# Patient Record
Sex: FEMALE | Race: ASIAN | Employment: FULL TIME | URBAN - METROPOLITAN AREA
[De-identification: names, ages, dates, MRNs, and addresses within clinical notes are randomized per-mention and may not be internally consistent; named-entity substitution may affect disease eponyms.]

---

## 2018-04-03 ENCOUNTER — OFFICE VISIT (OUTPATIENT)
Dept: INTERNAL MEDICINE CLINIC | Facility: CLINIC | Age: 38
End: 2018-04-03

## 2018-04-03 VITALS
RESPIRATION RATE: 16 BRPM | WEIGHT: 139 LBS | HEIGHT: 65 IN | DIASTOLIC BLOOD PRESSURE: 88 MMHG | TEMPERATURE: 98 F | BODY MASS INDEX: 23.16 KG/M2 | HEART RATE: 86 BPM | SYSTOLIC BLOOD PRESSURE: 132 MMHG

## 2018-04-03 DIAGNOSIS — Z00.00 WELLNESS EXAMINATION: Primary | ICD-10-CM

## 2018-04-03 DIAGNOSIS — Z71.84 TRAVEL ADVICE ENCOUNTER: ICD-10-CM

## 2018-04-03 DIAGNOSIS — Z23 NEED FOR TDAP VACCINATION: ICD-10-CM

## 2018-04-03 DIAGNOSIS — Z00.00 LABORATORY EXAMINATION ORDERED AS PART OF A ROUTINE GENERAL MEDICAL EXAMINATION: ICD-10-CM

## 2018-04-03 PROCEDURE — 90715 TDAP VACCINE 7 YRS/> IM: CPT | Performed by: INTERNAL MEDICINE

## 2018-04-03 PROCEDURE — 90471 IMMUNIZATION ADMIN: CPT | Performed by: INTERNAL MEDICINE

## 2018-04-03 PROCEDURE — 90632 HEPA VACCINE ADULT IM: CPT | Performed by: INTERNAL MEDICINE

## 2018-04-03 PROCEDURE — 99385 PREV VISIT NEW AGE 18-39: CPT | Performed by: INTERNAL MEDICINE

## 2018-04-03 PROCEDURE — 90472 IMMUNIZATION ADMIN EACH ADD: CPT | Performed by: INTERNAL MEDICINE

## 2018-04-03 NOTE — PROGRESS NOTES
[facility] Internal Medicine Office Note  Chief Complaint:   Patient presents with:  Establish Care  Wellness Visit: per pt last pap was 12/2017 with Dr. Sandra Bazan      HPI:   This is a 44year old female coming in for physical  HPI  Establishing body mass index is 23.13 kg/m² as calculated from the following:    Height as of this encounter: 65\". Weight as of this encounter: 139 lb. Vital signs reviewed. Appears stated age, well groomed. Physical Exam   Vitals reviewed.    Constitutional: She HEPATITIS A VACCINE      Orders Placed This Encounter      CMP      Lipid Panel      TSH W Reflex To Free T4 [E]      CBC W Differential W Platelet [E]      TdaP (Adacel, Boostrix) (51938) (DX V06.1/Z23)      Hep A, Adult (30372) (DxV05.3/Z23)    Meds

## 2018-04-03 NOTE — PATIENT INSTRUCTIONS
Check blood pressure periodically at home. Goal is below 140/90.  If persistently above 140 for top number or 90 for bottom, please follow up    Start typhoid vaccine pills in 1 week - take every other day for a total of 4 capsules    For your records:  Arnie Suresh

## 2018-04-13 ENCOUNTER — MED REC SCAN ONLY (OUTPATIENT)
Dept: INTERNAL MEDICINE CLINIC | Facility: CLINIC | Age: 38
End: 2018-04-13

## 2018-04-18 ENCOUNTER — TELEPHONE (OUTPATIENT)
Dept: INTERNAL MEDICINE CLINIC | Facility: CLINIC | Age: 38
End: 2018-04-18

## 2018-04-18 DIAGNOSIS — Z00.00 BLOOD TESTS FOR ROUTINE GENERAL PHYSICAL EXAMINATION: Primary | ICD-10-CM

## 2018-04-18 NOTE — TELEPHONE ENCOUNTER
Patient called and stated that she needs her order for labs to be changed to [facility]. Please advise.

## 2018-04-25 ENCOUNTER — TELEPHONE (OUTPATIENT)
Dept: INTERNAL MEDICINE CLINIC | Facility: CLINIC | Age: 38
End: 2018-04-25

## 2018-11-01 ENCOUNTER — TELEPHONE (OUTPATIENT)
Dept: INTERNAL MEDICINE CLINIC | Facility: CLINIC | Age: 38
End: 2018-11-01

## 2018-11-01 ENCOUNTER — NURSE ONLY (OUTPATIENT)
Dept: INTERNAL MEDICINE CLINIC | Facility: CLINIC | Age: 38
End: 2018-11-01
Payer: COMMERCIAL

## 2018-11-01 PROCEDURE — 90632 HEPA VACCINE ADULT IM: CPT | Performed by: INTERNAL MEDICINE

## 2018-11-01 PROCEDURE — 90471 IMMUNIZATION ADMIN: CPT | Performed by: INTERNAL MEDICINE

## 2019-04-09 ENCOUNTER — OFFICE VISIT (OUTPATIENT)
Dept: INTERNAL MEDICINE CLINIC | Facility: CLINIC | Age: 39
End: 2019-04-09
Payer: COMMERCIAL

## 2019-04-09 VITALS
DIASTOLIC BLOOD PRESSURE: 88 MMHG | BODY MASS INDEX: 23.95 KG/M2 | HEIGHT: 65 IN | SYSTOLIC BLOOD PRESSURE: 136 MMHG | RESPIRATION RATE: 16 BRPM | HEART RATE: 108 BPM | WEIGHT: 143.75 LBS | TEMPERATURE: 98 F

## 2019-04-09 DIAGNOSIS — Z00.00 LABORATORY EXAM ORDERED AS PART OF ROUTINE GENERAL MEDICAL EXAMINATION: ICD-10-CM

## 2019-04-09 DIAGNOSIS — Z00.00 ROUTINE PHYSICAL EXAMINATION: Primary | ICD-10-CM

## 2019-04-09 PROCEDURE — 99395 PREV VISIT EST AGE 18-39: CPT | Performed by: INTERNAL MEDICINE

## 2019-04-09 NOTE — PROGRESS NOTES
[facility] Internal Medicine Office Note  Chief Complaint:   Patient presents with:  Physical      HPI:   This is a 43year old female coming in for physical   HPI  Dr. Min Acevedo - alexandre and will see at the end of the month for pap    Blood pressur atraumatic. Right Ear: Tympanic membrane is not erythematous. Left Ear: Tympanic membrane is not erythematous. Mouth/Throat: No posterior oropharyngeal erythema. Eyes: Conjunctivae are normal.   Neck: Neck supple.    Cardiovascular: Normal rate, reg treatments as a result of today.      Ciarra Barnes MD

## 2019-04-09 NOTE — PATIENT INSTRUCTIONS
Measure blood pressure at home and goal is below 140 for top number(systolic) and below 90 for bottom number(diastolic)  If more than 2/3 of readings are above 90 for bottom number, follow up and we will discuss starting blood pressure medication.      Christian

## 2019-07-24 ENCOUNTER — OFFICE VISIT (OUTPATIENT)
Dept: INTERNAL MEDICINE CLINIC | Facility: CLINIC | Age: 39
End: 2019-07-24
Payer: COMMERCIAL

## 2019-07-24 VITALS
OXYGEN SATURATION: 98 % | SYSTOLIC BLOOD PRESSURE: 138 MMHG | HEART RATE: 81 BPM | BODY MASS INDEX: 24.32 KG/M2 | HEIGHT: 65 IN | RESPIRATION RATE: 16 BRPM | TEMPERATURE: 98 F | WEIGHT: 146 LBS | DIASTOLIC BLOOD PRESSURE: 88 MMHG

## 2019-07-24 DIAGNOSIS — I10 ESSENTIAL HYPERTENSION: Primary | ICD-10-CM

## 2019-07-24 DIAGNOSIS — G43.009 MIGRAINE WITHOUT AURA AND WITHOUT STATUS MIGRAINOSUS, NOT INTRACTABLE: ICD-10-CM

## 2019-07-24 PROCEDURE — 99213 OFFICE O/P EST LOW 20 MIN: CPT | Performed by: INTERNAL MEDICINE

## 2019-07-24 RX ORDER — ONDANSETRON 4 MG/1
4 TABLET, ORALLY DISINTEGRATING ORAL EVERY 8 HOURS PRN
Qty: 20 TABLET | Refills: 0 | Status: SHIPPED | OUTPATIENT
Start: 2019-07-24

## 2019-07-24 RX ORDER — LISINOPRIL 2.5 MG/1
2.5 TABLET ORAL DAILY
Qty: 30 TABLET | Refills: 1 | Status: SHIPPED | OUTPATIENT
Start: 2019-07-24 | End: 2019-10-05 | Stop reason: ALTCHOICE

## 2019-07-24 NOTE — PROGRESS NOTES
[address] [facility] Internal Medicine Office Note  Chief Complaint:   Patient presents with:  Blood Pressure      HPI:   This is a 43year old female coming in for blood pressure   HPI  HTN - diastolic high, usually in 90s-100s but also about a quarter of lb   SpO2 98%   BMI 24.30 kg/m²  Estimated body mass index is 24.3 kg/m² as calculated from the following:    Height as of this encounter: 65\". Weight as of this encounter: 146 lb. Vital signs reviewed. Appears stated age, well groomed.   Physical Exa Consults:  None    There are no preventive care reminders to display for this patient. Patient/Caregiver Education: Patient/Caregiver Education: There are no barriers to learning. Medical education done. Outcome: Patient verbalizes understanding.  Patient

## 2019-07-24 NOTE — PATIENT INSTRUCTIONS
Start lisinopril 2.5mg once daily     ondansetron as needed for nausea related to migraine.  Then 1-2 tablets of naproxen/aleve every 12 hours

## 2019-08-05 ENCOUNTER — PATIENT MESSAGE (OUTPATIENT)
Dept: INTERNAL MEDICINE CLINIC | Facility: CLINIC | Age: 39
End: 2019-08-05

## 2019-08-06 RX ORDER — AMLODIPINE BESYLATE 2.5 MG/1
2.5 TABLET ORAL DAILY
Qty: 30 TABLET | Refills: 0 | Status: SHIPPED | OUTPATIENT
Start: 2019-08-06 | End: 2019-08-20

## 2019-08-20 NOTE — TELEPHONE ENCOUNTER
Refill requested:   Requested Prescriptions     Pending Prescriptions Disp Refills   • amLODIPine Besylate 2.5 MG Oral Tab 30 tablet 0     Sig: Take 1 tablet (2.5 mg total) by mouth daily.      Passed protocol    Last refill: 8/6/19 30 0R     Labs:  4/27/19

## 2019-08-21 RX ORDER — AMLODIPINE BESYLATE 2.5 MG/1
2.5 TABLET ORAL DAILY
Qty: 90 TABLET | Refills: 0 | Status: SHIPPED | OUTPATIENT
Start: 2019-08-21 | End: 2019-11-20

## 2019-10-05 ENCOUNTER — OFFICE VISIT (OUTPATIENT)
Dept: INTERNAL MEDICINE CLINIC | Facility: CLINIC | Age: 39
End: 2019-10-05
Payer: COMMERCIAL

## 2019-10-05 VITALS
HEIGHT: 65 IN | SYSTOLIC BLOOD PRESSURE: 124 MMHG | BODY MASS INDEX: 24.41 KG/M2 | OXYGEN SATURATION: 99 % | RESPIRATION RATE: 16 BRPM | DIASTOLIC BLOOD PRESSURE: 76 MMHG | HEART RATE: 94 BPM | TEMPERATURE: 98 F | WEIGHT: 146.5 LBS

## 2019-10-05 DIAGNOSIS — Z23 NEED FOR INFLUENZA VACCINATION: ICD-10-CM

## 2019-10-05 DIAGNOSIS — I10 ESSENTIAL HYPERTENSION: Primary | ICD-10-CM

## 2019-10-05 PROCEDURE — 99213 OFFICE O/P EST LOW 20 MIN: CPT | Performed by: INTERNAL MEDICINE

## 2019-10-05 PROCEDURE — 90686 IIV4 VACC NO PRSV 0.5 ML IM: CPT | Performed by: INTERNAL MEDICINE

## 2019-10-05 PROCEDURE — 90471 IMMUNIZATION ADMIN: CPT | Performed by: INTERNAL MEDICINE

## 2019-10-05 NOTE — PROGRESS NOTES
[facility] Internal Medicine Office Note  Chief Complaint:   Patient presents with:  Medication Follow-Up      HPI:   This is a 35year old female coming in for BP follow up  HPI    HTN - on amlodipine 2.5mg and BP has been doing well - highest w 24.38 kg/m²  Estimated body mass index is 24.38 kg/m² as calculated from the following:    Height as of this encounter: 65\". Weight as of this encounter: 146 lb 8 oz (66.5 kg). Vital signs reviewed. Appears stated age, well groomed.   Physical Exam from the treatments as a result of today.      Dyllan Hanson MD

## 2019-11-16 ENCOUNTER — HOSPITAL ENCOUNTER (OUTPATIENT)
Dept: MAMMOGRAPHY | Facility: HOSPITAL | Age: 39
Discharge: HOME | End: 2019-11-16
Attending: STUDENT IN AN ORGANIZED HEALTH CARE EDUCATION/TRAINING PROGRAM
Payer: COMMERCIAL

## 2019-11-16 PROCEDURE — 77067 SCR MAMMO BI INCL CAD: CPT | Performed by: STUDENT IN AN ORGANIZED HEALTH CARE EDUCATION/TRAINING PROGRAM

## 2019-11-16 PROCEDURE — 77063 BREAST TOMOSYNTHESIS BI: CPT | Performed by: STUDENT IN AN ORGANIZED HEALTH CARE EDUCATION/TRAINING PROGRAM

## 2019-11-21 RX ORDER — AMLODIPINE BESYLATE 2.5 MG/1
TABLET ORAL
Qty: 90 TABLET | Refills: 1 | Status: SHIPPED | OUTPATIENT
Start: 2019-11-21 | End: 2020-06-01

## 2019-11-21 NOTE — TELEPHONE ENCOUNTER
Amlodipine Besylate 2.5 mg Oral Tab     Passed Protocol    Last OV relevant to medication: 10/5/2019  Last refill date: 8/21/2019     #/refills: #90 w/ 0 refills   When pt was asked to return for OV: 6 months   Upcoming appt/reason: No future appointments

## 2020-06-01 RX ORDER — AMLODIPINE BESYLATE 2.5 MG/1
TABLET ORAL
Qty: 90 TABLET | Refills: 0 | Status: SHIPPED | OUTPATIENT
Start: 2020-06-01 | End: 2020-08-31

## 2020-06-26 ENCOUNTER — OFFICE VISIT (OUTPATIENT)
Dept: INTERNAL MEDICINE CLINIC | Facility: CLINIC | Age: 40
End: 2020-06-26
Payer: COMMERCIAL

## 2020-06-26 VITALS
HEART RATE: 98 BPM | HEIGHT: 65.25 IN | BODY MASS INDEX: 24.53 KG/M2 | DIASTOLIC BLOOD PRESSURE: 76 MMHG | OXYGEN SATURATION: 99 % | SYSTOLIC BLOOD PRESSURE: 124 MMHG | RESPIRATION RATE: 16 BRPM | TEMPERATURE: 98 F | WEIGHT: 149 LBS

## 2020-06-26 DIAGNOSIS — Z00.00 ENCOUNTER FOR ROUTINE ADULT MEDICAL EXAMINATION: Primary | ICD-10-CM

## 2020-06-26 DIAGNOSIS — I10 ESSENTIAL HYPERTENSION: ICD-10-CM

## 2020-06-26 DIAGNOSIS — Z00.00 LABORATORY EXAM ORDERED AS PART OF ROUTINE GENERAL MEDICAL EXAMINATION: ICD-10-CM

## 2020-06-26 PROCEDURE — 99396 PREV VISIT EST AGE 40-64: CPT | Performed by: INTERNAL MEDICINE

## 2020-06-26 RX ORDER — MULTIVIT,CALC,MINS/IRON/FOLIC 500-18-0.4
TABLET ORAL
COMMUNITY
Start: 2020-01-01

## 2020-06-26 NOTE — PROGRESS NOTES
[facility] Internal Medicine Office Note  Chief Complaint:   Patient presents with:  Physical      HPI:   This is a 35year old female coming in for physical   HPI  She has been having worsening cramping and headaches for the week prior to her me Location: Right arm, Patient Position: Sitting, Cuff Size: adult)   Pulse 98   Temp 98.2 °F (36.8 °C) (Oral)   Resp 16   Ht 65.25\"   Wt 149 lb (67.6 kg)   LMP 11/04/2019 (Exact Date)   SpO2 99%   BMI 24.61 kg/m²  Estimated body mass index is 24.61 kg/m² a Reflex To Free T4 [E]      Meds & Refills for this Visit:  Requested Prescriptions      No prescriptions requested or ordered in this encounter       Imaging & Consults:  None    Pap Smear,3 Years due on 04/05/2020  Annual Physical due on 04/09/2020  Serina

## 2020-07-08 ENCOUNTER — LAB ENCOUNTER (OUTPATIENT)
Dept: LAB | Age: 40
End: 2020-07-08
Attending: INTERNAL MEDICINE
Payer: COMMERCIAL

## 2020-07-08 DIAGNOSIS — Z00.00 LABORATORY EXAM ORDERED AS PART OF ROUTINE GENERAL MEDICAL EXAMINATION: ICD-10-CM

## 2020-07-08 LAB
ALBUMIN SERPL-MCNC: 4.2 G/DL (ref 3.4–5)
ALBUMIN/GLOB SERPL: 1.2 {RATIO} (ref 1–2)
ALP LIVER SERPL-CCNC: 66 U/L (ref 37–98)
ALT SERPL-CCNC: 62 U/L (ref 13–56)
ANION GAP SERPL CALC-SCNC: 3 MMOL/L (ref 0–18)
AST SERPL-CCNC: 25 U/L (ref 15–37)
BASOPHILS # BLD AUTO: 0.07 X10(3) UL (ref 0–0.2)
BASOPHILS NFR BLD AUTO: 1 %
BILIRUB SERPL-MCNC: 0.9 MG/DL (ref 0.1–2)
BUN BLD-MCNC: 15 MG/DL (ref 7–18)
BUN/CREAT SERPL: 17.6 (ref 10–20)
CALCIUM BLD-MCNC: 9 MG/DL (ref 8.5–10.1)
CHLORIDE SERPL-SCNC: 107 MMOL/L (ref 98–112)
CHOLEST SMN-MCNC: 208 MG/DL (ref ?–200)
CO2 SERPL-SCNC: 28 MMOL/L (ref 21–32)
CREAT BLD-MCNC: 0.85 MG/DL (ref 0.55–1.02)
DEPRECATED RDW RBC AUTO: 46.4 FL (ref 35.1–46.3)
EOSINOPHIL # BLD AUTO: 0.09 X10(3) UL (ref 0–0.7)
EOSINOPHIL NFR BLD AUTO: 1.2 %
ERYTHROCYTE [DISTWIDTH] IN BLOOD BY AUTOMATED COUNT: 13.5 % (ref 11–15)
GLOBULIN PLAS-MCNC: 3.5 G/DL (ref 2.8–4.4)
GLUCOSE BLD-MCNC: 100 MG/DL (ref 70–99)
HCT VFR BLD AUTO: 44.1 % (ref 35–48)
HDLC SERPL-MCNC: 43 MG/DL (ref 40–59)
HGB BLD-MCNC: 14.4 G/DL (ref 12–16)
IMM GRANULOCYTES # BLD AUTO: 0.02 X10(3) UL (ref 0–1)
IMM GRANULOCYTES NFR BLD: 0.3 %
LDLC SERPL CALC-MCNC: 126 MG/DL (ref ?–100)
LYMPHOCYTES # BLD AUTO: 1.96 X10(3) UL (ref 1–4)
LYMPHOCYTES NFR BLD AUTO: 26.7 %
M PROTEIN MFR SERPL ELPH: 7.7 G/DL (ref 6.4–8.2)
MCH RBC QN AUTO: 30.8 PG (ref 26–34)
MCHC RBC AUTO-ENTMCNC: 32.7 G/DL (ref 31–37)
MCV RBC AUTO: 94.2 FL (ref 80–100)
MONOCYTES # BLD AUTO: 0.56 X10(3) UL (ref 0.1–1)
MONOCYTES NFR BLD AUTO: 7.6 %
NEUTROPHILS # BLD AUTO: 4.65 X10 (3) UL (ref 1.5–7.7)
NEUTROPHILS # BLD AUTO: 4.65 X10(3) UL (ref 1.5–7.7)
NEUTROPHILS NFR BLD AUTO: 63.2 %
NONHDLC SERPL-MCNC: 165 MG/DL (ref ?–130)
OSMOLALITY SERPL CALC.SUM OF ELEC: 287 MOSM/KG (ref 275–295)
PATIENT FASTING Y/N/NP: YES
PATIENT FASTING Y/N/NP: YES
PLATELET # BLD AUTO: 204 10(3)UL (ref 150–450)
POTASSIUM SERPL-SCNC: 3.8 MMOL/L (ref 3.5–5.1)
RBC # BLD AUTO: 4.68 X10(6)UL (ref 3.8–5.3)
SODIUM SERPL-SCNC: 138 MMOL/L (ref 136–145)
TRIGL SERPL-MCNC: 196 MG/DL (ref 30–149)
TSI SER-ACNC: 1.48 MIU/ML (ref 0.36–3.74)
VLDLC SERPL CALC-MCNC: 39 MG/DL (ref 0–30)
WBC # BLD AUTO: 7.4 X10(3) UL (ref 4–11)

## 2020-07-08 PROCEDURE — 80061 LIPID PANEL: CPT | Performed by: INTERNAL MEDICINE

## 2020-07-08 PROCEDURE — 36415 COLL VENOUS BLD VENIPUNCTURE: CPT | Performed by: INTERNAL MEDICINE

## 2020-07-08 PROCEDURE — 80050 GENERAL HEALTH PANEL: CPT | Performed by: INTERNAL MEDICINE

## 2020-07-09 DIAGNOSIS — R74.8 ELEVATED LIVER ENZYMES: Primary | ICD-10-CM

## 2020-07-16 ENCOUNTER — TELEPHONE (OUTPATIENT)
Dept: INTERNAL MEDICINE CLINIC | Facility: CLINIC | Age: 40
End: 2020-07-16

## 2020-07-28 ENCOUNTER — APPOINTMENT (OUTPATIENT)
Dept: LAB | Age: 40
End: 2020-07-28
Attending: INTERNAL MEDICINE
Payer: COMMERCIAL

## 2020-07-28 DIAGNOSIS — R74.8 ELEVATED LIVER ENZYMES: ICD-10-CM

## 2020-07-28 LAB
ALBUMIN SERPL-MCNC: 3.9 G/DL (ref 3.4–5)
ALP LIVER SERPL-CCNC: 70 U/L (ref 37–98)
ALT SERPL-CCNC: 83 U/L (ref 13–56)
AST SERPL-CCNC: 26 U/L (ref 15–37)
BILIRUB DIRECT SERPL-MCNC: 0.1 MG/DL (ref 0–0.2)
BILIRUB SERPL-MCNC: 0.6 MG/DL (ref 0.1–2)
DEPRECATED HBV CORE AB SER IA-ACNC: 23.3 NG/ML (ref 12–240)
HBV SURFACE AB SER QL: NONREACTIVE
HBV SURFACE AB SERPL IA-ACNC: 6.64 MIU/ML
HBV SURFACE AG SER-ACNC: <0.1 [IU]/L
HBV SURFACE AG SERPL QL IA: NONREACTIVE
HCV AB SERPL QL IA: NONREACTIVE
M PROTEIN MFR SERPL ELPH: 7.6 G/DL (ref 6.4–8.2)
TRANSFERRIN SERPL-MCNC: 343 MG/DL (ref 200–360)

## 2020-07-28 PROCEDURE — 86706 HEP B SURFACE ANTIBODY: CPT | Performed by: INTERNAL MEDICINE

## 2020-07-28 PROCEDURE — 80076 HEPATIC FUNCTION PANEL: CPT | Performed by: INTERNAL MEDICINE

## 2020-07-28 PROCEDURE — 87340 HEPATITIS B SURFACE AG IA: CPT | Performed by: INTERNAL MEDICINE

## 2020-07-28 PROCEDURE — 84466 ASSAY OF TRANSFERRIN: CPT | Performed by: INTERNAL MEDICINE

## 2020-07-28 PROCEDURE — 86803 HEPATITIS C AB TEST: CPT | Performed by: INTERNAL MEDICINE

## 2020-07-28 PROCEDURE — 82728 ASSAY OF FERRITIN: CPT | Performed by: INTERNAL MEDICINE

## 2020-07-28 PROCEDURE — 36415 COLL VENOUS BLD VENIPUNCTURE: CPT | Performed by: INTERNAL MEDICINE

## 2020-07-30 DIAGNOSIS — R74.8 ELEVATED LIVER ENZYMES: Primary | ICD-10-CM

## 2020-07-31 ENCOUNTER — TELEPHONE (OUTPATIENT)
Dept: INTERNAL MEDICINE CLINIC | Facility: CLINIC | Age: 40
End: 2020-07-31

## 2020-08-03 ENCOUNTER — NURSE ONLY (OUTPATIENT)
Dept: INTERNAL MEDICINE CLINIC | Facility: CLINIC | Age: 40
End: 2020-08-03
Payer: COMMERCIAL

## 2020-08-03 PROCEDURE — 90746 HEPB VACCINE 3 DOSE ADULT IM: CPT | Performed by: INTERNAL MEDICINE

## 2020-08-03 PROCEDURE — 90471 IMMUNIZATION ADMIN: CPT | Performed by: INTERNAL MEDICINE

## 2020-08-15 ENCOUNTER — HOSPITAL ENCOUNTER (OUTPATIENT)
Dept: ULTRASOUND IMAGING | Age: 40
Discharge: HOME | End: 2020-08-15
Attending: INTERNAL MEDICINE
Payer: COMMERCIAL

## 2020-08-15 DIAGNOSIS — R74.8 ELEVATED LIVER ENZYMES: ICD-10-CM

## 2020-08-15 PROCEDURE — 76700 US EXAM ABDOM COMPLETE: CPT | Performed by: INTERNAL MEDICINE

## 2020-08-18 DIAGNOSIS — R74.8 ELEVATED LIVER ENZYMES: Primary | ICD-10-CM

## 2020-08-31 NOTE — TELEPHONE ENCOUNTER
LOV: 6/26/2020 with Dr. Pat Snyder  RTC: 6 months  Last Relevant Labs: July 2020  Filled: 6/1/2020  #90 with 0 refills    Future Appointments   Date Time Provider Kaela Olsen   9/8/2020  2:30 PM [facility] 08 NURSE [facility] 8 [facility] [place]

## 2020-09-01 RX ORDER — AMLODIPINE BESYLATE 2.5 MG/1
2.5 TABLET ORAL DAILY
Qty: 90 TABLET | Refills: 1 | Status: SHIPPED | OUTPATIENT
Start: 2020-09-01 | End: 2021-02-16

## 2020-09-08 ENCOUNTER — NURSE ONLY (OUTPATIENT)
Dept: INTERNAL MEDICINE CLINIC | Facility: CLINIC | Age: 40
End: 2020-09-08
Payer: COMMERCIAL

## 2020-09-08 ENCOUNTER — TELEPHONE (OUTPATIENT)
Dept: INTERNAL MEDICINE CLINIC | Facility: CLINIC | Age: 40
End: 2020-09-08

## 2020-09-08 PROCEDURE — 90746 HEPB VACCINE 3 DOSE ADULT IM: CPT | Performed by: INTERNAL MEDICINE

## 2020-09-08 PROCEDURE — 90471 IMMUNIZATION ADMIN: CPT | Performed by: INTERNAL MEDICINE

## 2020-09-08 NOTE — TELEPHONE ENCOUNTER
Pt received 1st Hep B on 8/3/2020    Pt coming in for nurse visit today for 2nd Hep B. Please advise.  TY

## 2020-09-19 ENCOUNTER — LAB ENCOUNTER (OUTPATIENT)
Dept: LAB | Age: 40
End: 2020-09-19
Attending: INTERNAL MEDICINE
Payer: COMMERCIAL

## 2020-09-19 DIAGNOSIS — R74.8 ELEVATED LIVER ENZYMES: ICD-10-CM

## 2020-09-19 LAB
ALBUMIN SERPL-MCNC: 4.1 G/DL (ref 3.4–5)
ALBUMIN/GLOB SERPL: 1.2 {RATIO} (ref 1–2)
ALP LIVER SERPL-CCNC: 70 U/L
ALT SERPL-CCNC: 60 U/L
ANION GAP SERPL CALC-SCNC: 6 MMOL/L (ref 0–18)
AST SERPL-CCNC: 25 U/L (ref 15–37)
BILIRUB SERPL-MCNC: 0.5 MG/DL (ref 0.1–2)
BUN BLD-MCNC: 12 MG/DL (ref 7–18)
BUN/CREAT SERPL: 15.8 (ref 10–20)
CALCIUM BLD-MCNC: 9.2 MG/DL (ref 8.5–10.1)
CHLORIDE SERPL-SCNC: 106 MMOL/L (ref 98–112)
CO2 SERPL-SCNC: 28 MMOL/L (ref 21–32)
CREAT BLD-MCNC: 0.76 MG/DL
GLOBULIN PLAS-MCNC: 3.5 G/DL (ref 2.8–4.4)
GLUCOSE BLD-MCNC: 100 MG/DL (ref 70–99)
M PROTEIN MFR SERPL ELPH: 7.6 G/DL (ref 6.4–8.2)
OSMOLALITY SERPL CALC.SUM OF ELEC: 290 MOSM/KG (ref 275–295)
PATIENT FASTING Y/N/NP: YES
POTASSIUM SERPL-SCNC: 3.7 MMOL/L (ref 3.5–5.1)
SODIUM SERPL-SCNC: 140 MMOL/L (ref 136–145)

## 2020-09-19 PROCEDURE — 80053 COMPREHEN METABOLIC PANEL: CPT

## 2020-09-19 PROCEDURE — 36415 COLL VENOUS BLD VENIPUNCTURE: CPT

## 2020-09-21 DIAGNOSIS — R74.8 ELEVATED LIVER ENZYMES: Primary | ICD-10-CM

## 2020-12-21 ENCOUNTER — LAB ENCOUNTER (OUTPATIENT)
Dept: LAB | Age: 40
End: 2020-12-21
Attending: INTERNAL MEDICINE
Payer: COMMERCIAL

## 2020-12-21 DIAGNOSIS — R74.8 ELEVATED LIVER ENZYMES: ICD-10-CM

## 2020-12-21 PROCEDURE — 36415 COLL VENOUS BLD VENIPUNCTURE: CPT

## 2020-12-21 PROCEDURE — 80076 HEPATIC FUNCTION PANEL: CPT

## 2021-01-28 ENCOUNTER — TELEPHONE (OUTPATIENT)
Dept: INTERNAL MEDICINE CLINIC | Facility: CLINIC | Age: 41
End: 2021-01-28

## 2021-01-28 NOTE — TELEPHONE ENCOUNTER
1st hep b given 8/3/20  2nd hep b given 9/8/20    3rd hep b orders pending. Will pt need to wait until 2/8/21 to receive?

## 2021-01-29 ENCOUNTER — HOSPITAL ENCOUNTER (OUTPATIENT)
Dept: MAMMOGRAPHY | Age: 41
Discharge: HOME | End: 2021-01-29
Attending: STUDENT IN AN ORGANIZED HEALTH CARE EDUCATION/TRAINING PROGRAM
Payer: COMMERCIAL

## 2021-01-29 PROCEDURE — 77063 BREAST TOMOSYNTHESIS BI: CPT | Performed by: STUDENT IN AN ORGANIZED HEALTH CARE EDUCATION/TRAINING PROGRAM

## 2021-01-29 PROCEDURE — 77067 SCR MAMMO BI INCL CAD: CPT | Performed by: STUDENT IN AN ORGANIZED HEALTH CARE EDUCATION/TRAINING PROGRAM

## 2021-02-03 ENCOUNTER — NURSE ONLY (OUTPATIENT)
Dept: INTERNAL MEDICINE CLINIC | Facility: CLINIC | Age: 41
End: 2021-02-03
Payer: COMMERCIAL

## 2021-02-03 PROCEDURE — 90746 HEPB VACCINE 3 DOSE ADULT IM: CPT | Performed by: INTERNAL MEDICINE

## 2021-02-03 PROCEDURE — 90471 IMMUNIZATION ADMIN: CPT | Performed by: INTERNAL MEDICINE

## 2021-02-15 NOTE — TELEPHONE ENCOUNTER
Name from pharmacy: AMLODIPINE BESYLATE 2.5 MG TAB          Will file in chart as: AMLODIPINE BESYLATE 2.5 MG Oral Tab    Sig: TAKE 1 TABLET BY MOUTH EVERY DAY    Disp:  90 tablet    Refills:  1    Start: 2/12/2021    Class: Normal    Non-formulary    Last

## 2021-02-16 RX ORDER — AMLODIPINE BESYLATE 2.5 MG/1
TABLET ORAL
Qty: 90 TABLET | Refills: 0 | Status: SHIPPED | OUTPATIENT
Start: 2021-02-16 | End: 2021-03-02

## 2021-03-01 NOTE — PROGRESS NOTES
José Luis Gaitan is a 38year old female. HPI:   Patient presents for f/u of HTN. Takes amlodipine nightly, no SEs. Home BP readings in the 120s-130s/80-90s. Home resting HR in the 90s.   Admits to being anxious when coming into dr office since Obi kg)   LMP 02/16/2021   SpO2 99%   BMI 25.01 kg/m²   GENERAL: well developed, well nourished, in no acute distress  SKIN: no rashes, no suspicious lesions  HEENT: normocephalic, atraumatic, conjunctiva clear  NECK: supple, no thyromegaly, no lymphadenopathy

## 2021-03-02 ENCOUNTER — OFFICE VISIT (OUTPATIENT)
Dept: INTERNAL MEDICINE CLINIC | Facility: CLINIC | Age: 41
End: 2021-03-02
Payer: COMMERCIAL

## 2021-03-02 VITALS
DIASTOLIC BLOOD PRESSURE: 88 MMHG | SYSTOLIC BLOOD PRESSURE: 130 MMHG | RESPIRATION RATE: 17 BRPM | BODY MASS INDEX: 25.12 KG/M2 | TEMPERATURE: 98 F | WEIGHT: 152.63 LBS | OXYGEN SATURATION: 99 % | HEIGHT: 65.5 IN | HEART RATE: 100 BPM

## 2021-03-02 DIAGNOSIS — Z00.00 LABORATORY EXAM ORDERED AS PART OF ROUTINE GENERAL MEDICAL EXAMINATION: ICD-10-CM

## 2021-03-02 DIAGNOSIS — R00.0 TACHYCARDIA: ICD-10-CM

## 2021-03-02 DIAGNOSIS — I10 ESSENTIAL HYPERTENSION: Primary | ICD-10-CM

## 2021-03-02 DIAGNOSIS — G43.009 MIGRAINE WITHOUT AURA AND WITHOUT STATUS MIGRAINOSUS, NOT INTRACTABLE: ICD-10-CM

## 2021-03-02 PROCEDURE — 3008F BODY MASS INDEX DOCD: CPT | Performed by: PHYSICIAN ASSISTANT

## 2021-03-02 PROCEDURE — 3079F DIAST BP 80-89 MM HG: CPT | Performed by: PHYSICIAN ASSISTANT

## 2021-03-02 PROCEDURE — 99214 OFFICE O/P EST MOD 30 MIN: CPT | Performed by: PHYSICIAN ASSISTANT

## 2021-03-02 PROCEDURE — 3075F SYST BP GE 130 - 139MM HG: CPT | Performed by: PHYSICIAN ASSISTANT

## 2021-03-02 RX ORDER — BACILLUS COAGULANS/INULIN 1B-250 MG
1 CAPSULE ORAL DAILY
COMMUNITY
Start: 2021-01-07 | End: 2021-07-15

## 2021-03-02 RX ORDER — AMLODIPINE BESYLATE 2.5 MG/1
5 TABLET ORAL DAILY
COMMUNITY
Start: 2021-03-02 | End: 2021-03-16

## 2021-03-02 NOTE — PATIENT INSTRUCTIONS
High blood pressure:  - increase amlodipine to 5 mg and take in the morning  - start checking at home: sit in a chair with your back supported and feet flat on the floor for at least 5 minutes before checking  - send Maninder Burt a [employer] message with your scottie

## 2021-04-08 NOTE — TELEPHONE ENCOUNTER
Passed protocol    Requesting amLODIPine Besylate 10 MG Oral Tab  LOV: 3/2/21  RTC: 5 months  Last Relevant Labs: 9/19/20  Filled: 3/16/21 #30 with 0 refills    No future appointments.

## 2021-04-10 RX ORDER — AMLODIPINE BESYLATE 10 MG/1
TABLET ORAL
Qty: 30 TABLET | Refills: 0 | Status: SHIPPED | OUTPATIENT
Start: 2021-04-10 | End: 2021-05-11

## 2021-05-03 RX ORDER — VALSARTAN 80 MG/1
TABLET ORAL
Qty: 30 TABLET | Refills: 2 | Status: SHIPPED | OUTPATIENT
Start: 2021-05-03 | End: 2021-07-15

## 2021-05-03 NOTE — TELEPHONE ENCOUNTER
Passed protocol    Requesting valsartan 80 MG Oral Tab  LOV: 3/2/21  RTC: 4 months  Last Relevant Labs: 7/28/20  Filled: 4/7/21 #30 with 0 refills    No future appointments.

## 2021-05-13 RX ORDER — AMLODIPINE BESYLATE 10 MG/1
10 TABLET ORAL DAILY
Qty: 90 TABLET | Refills: 0 | Status: SHIPPED | OUTPATIENT
Start: 2021-05-13 | End: 2021-07-15

## 2021-05-13 NOTE — TELEPHONE ENCOUNTER
Medication(s) to Refill:   Requested Prescriptions     Pending Prescriptions Disp Refills   • amLODIPine Besylate 10 MG Oral Tab 90 tablet 0     Sig: Take 1 tablet (10 mg total) by mouth daily.      HTN: consistently above goal.  Increase amlodipine to 5 mg

## 2021-07-01 ENCOUNTER — MED REC SCAN ONLY (OUTPATIENT)
Dept: INTERNAL MEDICINE CLINIC | Facility: CLINIC | Age: 41
End: 2021-07-01

## 2021-07-09 ENCOUNTER — LAB ENCOUNTER (OUTPATIENT)
Dept: LAB | Age: 41
End: 2021-07-09
Attending: PHYSICIAN ASSISTANT
Payer: COMMERCIAL

## 2021-07-09 DIAGNOSIS — I10 ESSENTIAL HYPERTENSION: ICD-10-CM

## 2021-07-09 DIAGNOSIS — G43.009 MIGRAINE WITHOUT AURA AND WITHOUT STATUS MIGRAINOSUS, NOT INTRACTABLE: ICD-10-CM

## 2021-07-09 DIAGNOSIS — Z00.00 LABORATORY EXAM ORDERED AS PART OF ROUTINE GENERAL MEDICAL EXAMINATION: ICD-10-CM

## 2021-07-09 LAB
ALBUMIN SERPL-MCNC: 4.1 G/DL (ref 3.4–5)
ALBUMIN/GLOB SERPL: 1.2 {RATIO} (ref 1–2)
ALP LIVER SERPL-CCNC: 76 U/L
ALT SERPL-CCNC: 64 U/L
ANION GAP SERPL CALC-SCNC: 6 MMOL/L (ref 0–18)
AST SERPL-CCNC: 26 U/L (ref 15–37)
BASOPHILS # BLD AUTO: 0.07 X10(3) UL (ref 0–0.2)
BASOPHILS NFR BLD AUTO: 0.9 %
BILIRUB SERPL-MCNC: 0.5 MG/DL (ref 0.1–2)
BUN BLD-MCNC: 14 MG/DL (ref 7–18)
BUN/CREAT SERPL: 18.9 (ref 10–20)
CALCIUM BLD-MCNC: 8.8 MG/DL (ref 8.5–10.1)
CHLORIDE SERPL-SCNC: 107 MMOL/L (ref 98–112)
CHOLEST SMN-MCNC: 235 MG/DL (ref ?–200)
CO2 SERPL-SCNC: 26 MMOL/L (ref 21–32)
CREAT BLD-MCNC: 0.74 MG/DL
DEPRECATED RDW RBC AUTO: 45.9 FL (ref 35.1–46.3)
EOSINOPHIL # BLD AUTO: 0.12 X10(3) UL (ref 0–0.7)
EOSINOPHIL NFR BLD AUTO: 1.5 %
ERYTHROCYTE [DISTWIDTH] IN BLOOD BY AUTOMATED COUNT: 13.4 % (ref 11–15)
GLOBULIN PLAS-MCNC: 3.3 G/DL (ref 2.8–4.4)
GLUCOSE BLD-MCNC: 90 MG/DL (ref 70–99)
HCT VFR BLD AUTO: 43.6 %
HCV AB SERPL QL IA: NONREACTIVE
HDLC SERPL-MCNC: 50 MG/DL (ref 40–59)
HGB BLD-MCNC: 13.8 G/DL
IMM GRANULOCYTES # BLD AUTO: 0.05 X10(3) UL (ref 0–1)
IMM GRANULOCYTES NFR BLD: 0.6 %
LDLC SERPL CALC-MCNC: 150 MG/DL (ref ?–100)
LYMPHOCYTES # BLD AUTO: 2.12 X10(3) UL (ref 1–4)
LYMPHOCYTES NFR BLD AUTO: 27 %
M PROTEIN MFR SERPL ELPH: 7.4 G/DL (ref 6.4–8.2)
MCH RBC QN AUTO: 29.7 PG (ref 26–34)
MCHC RBC AUTO-ENTMCNC: 31.7 G/DL (ref 31–37)
MCV RBC AUTO: 94 FL
MONOCYTES # BLD AUTO: 0.71 X10(3) UL (ref 0.1–1)
MONOCYTES NFR BLD AUTO: 9.1 %
NEUTROPHILS # BLD AUTO: 4.77 X10 (3) UL (ref 1.5–7.7)
NEUTROPHILS # BLD AUTO: 4.77 X10(3) UL (ref 1.5–7.7)
NEUTROPHILS NFR BLD AUTO: 60.9 %
NONHDLC SERPL-MCNC: 185 MG/DL (ref ?–130)
OSMOLALITY SERPL CALC.SUM OF ELEC: 288 MOSM/KG (ref 275–295)
PLATELET # BLD AUTO: 214 10(3)UL (ref 150–450)
POTASSIUM SERPL-SCNC: 3.8 MMOL/L (ref 3.5–5.1)
RBC # BLD AUTO: 4.64 X10(6)UL
SODIUM SERPL-SCNC: 139 MMOL/L (ref 136–145)
TRIGL SERPL-MCNC: 195 MG/DL (ref 30–149)
TSI SER-ACNC: 1.91 MIU/ML (ref 0.36–3.74)
VLDLC SERPL CALC-MCNC: 37 MG/DL (ref 0–30)
WBC # BLD AUTO: 7.8 X10(3) UL (ref 4–11)

## 2021-07-09 PROCEDURE — 80050 GENERAL HEALTH PANEL: CPT | Performed by: PHYSICIAN ASSISTANT

## 2021-07-09 PROCEDURE — 80061 LIPID PANEL: CPT | Performed by: PHYSICIAN ASSISTANT

## 2021-07-09 PROCEDURE — 86803 HEPATITIS C AB TEST: CPT | Performed by: PHYSICIAN ASSISTANT

## 2021-07-13 ENCOUNTER — TELEPHONE (OUTPATIENT)
Dept: INTERNAL MEDICINE CLINIC | Facility: CLINIC | Age: 41
End: 2021-07-13

## 2021-07-15 ENCOUNTER — OFFICE VISIT (OUTPATIENT)
Dept: INTERNAL MEDICINE CLINIC | Facility: CLINIC | Age: 41
End: 2021-07-15
Payer: COMMERCIAL

## 2021-07-15 VITALS
SYSTOLIC BLOOD PRESSURE: 138 MMHG | RESPIRATION RATE: 16 BRPM | HEIGHT: 65 IN | OXYGEN SATURATION: 98 % | DIASTOLIC BLOOD PRESSURE: 86 MMHG | TEMPERATURE: 99 F | BODY MASS INDEX: 25.72 KG/M2 | WEIGHT: 154.38 LBS | HEART RATE: 100 BPM

## 2021-07-15 DIAGNOSIS — R74.8 ELEVATED LIVER ENZYMES: ICD-10-CM

## 2021-07-15 DIAGNOSIS — I10 ESSENTIAL HYPERTENSION: ICD-10-CM

## 2021-07-15 DIAGNOSIS — Z00.00 ENCOUNTER FOR ROUTINE ADULT MEDICAL EXAMINATION: Primary | ICD-10-CM

## 2021-07-15 PROCEDURE — 3075F SYST BP GE 130 - 139MM HG: CPT | Performed by: INTERNAL MEDICINE

## 2021-07-15 PROCEDURE — 3079F DIAST BP 80-89 MM HG: CPT | Performed by: INTERNAL MEDICINE

## 2021-07-15 PROCEDURE — 3008F BODY MASS INDEX DOCD: CPT | Performed by: INTERNAL MEDICINE

## 2021-07-15 PROCEDURE — 99396 PREV VISIT EST AGE 40-64: CPT | Performed by: INTERNAL MEDICINE

## 2021-07-15 RX ORDER — CALCIUM CITRATE/VITAMIN D3 200MG-6.25
1 TABLET ORAL DAILY
COMMUNITY
Start: 2021-07-02

## 2021-07-15 RX ORDER — VALSARTAN 80 MG/1
80 TABLET ORAL DAILY
Qty: 90 TABLET | Refills: 3 | Status: SHIPPED | OUTPATIENT
Start: 2021-07-15

## 2021-07-15 RX ORDER — AMLODIPINE BESYLATE 10 MG/1
10 TABLET ORAL DAILY
Qty: 90 TABLET | Refills: 3 | Status: SHIPPED | OUTPATIENT
Start: 2021-07-15

## 2021-07-15 NOTE — PROGRESS NOTES
[address] [facility] Internal Medicine Office Note  Chief Complaint:   Patient presents with:  Physical  Lab Results      HPI:   This is a 38year old female coming in for  HPI    HTN - blood pressure well controlled at home    Received covid vaccine  Tdap Respiratory: Negative for shortness of breath. Cardiovascular: Negative for chest pain. Gastrointestinal: Negative for constipation. Genitourinary: Negative for dysuria. Neurological: Negative. Hematological: Negative.     Psychiatric/Behavior Elevated liver enzymes - fatty liver infiltration seen on US last year. AST 64 and AST 26. Monitor for now. Hepatitis panel and iron were normal/negative. Other orders  -     valsartan 80 MG Oral Tab; Take 1 tablet (80 mg total) by mouth daily.   -

## 2021-08-13 RX ORDER — AMLODIPINE BESYLATE 2.5 MG/1
TABLET ORAL
Qty: 90 TABLET | Refills: 0 | Status: SHIPPED | OUTPATIENT
Start: 2021-08-13

## 2021-08-13 NOTE — TELEPHONE ENCOUNTER
Name from pharmacy: AMLODIPINE BESYLATE 2.5 MG TAB         Will file in chart as: AMLODIPINE BESYLATE 2.5 MG Oral Tab    The original prescription was discontinued on 3/2/2021 by TYRONE Hughes. Renewing this prescription may not be appropriate.     Si

## 2022-02-07 ENCOUNTER — HOSPITAL ENCOUNTER (OUTPATIENT)
Dept: MAMMOGRAPHY | Age: 42
Discharge: HOME | End: 2022-02-07
Attending: STUDENT IN AN ORGANIZED HEALTH CARE EDUCATION/TRAINING PROGRAM
Payer: COMMERCIAL

## 2022-02-07 PROCEDURE — 77067 SCR MAMMO BI INCL CAD: CPT | Performed by: STUDENT IN AN ORGANIZED HEALTH CARE EDUCATION/TRAINING PROGRAM

## 2022-02-07 PROCEDURE — 77063 BREAST TOMOSYNTHESIS BI: CPT | Performed by: STUDENT IN AN ORGANIZED HEALTH CARE EDUCATION/TRAINING PROGRAM

## 2022-03-23 ENCOUNTER — LAB ENCOUNTER (OUTPATIENT)
Dept: LAB | Age: 42
End: 2022-03-23
Attending: PHYSICIAN ASSISTANT
Payer: COMMERCIAL

## 2022-03-23 DIAGNOSIS — R53.83 FATIGUE, UNSPECIFIED TYPE: ICD-10-CM

## 2022-03-23 DIAGNOSIS — L65.9 HAIR THINNING: ICD-10-CM

## 2022-03-23 DIAGNOSIS — R74.8 ELEVATED LIVER ENZYMES: ICD-10-CM

## 2022-03-23 DIAGNOSIS — E78.5 HYPERLIPIDEMIA, UNSPECIFIED HYPERLIPIDEMIA TYPE: ICD-10-CM

## 2022-03-23 DIAGNOSIS — N92.6 IRREGULAR MENSES: ICD-10-CM

## 2022-03-23 DIAGNOSIS — R68.89 COLD INTOLERANCE: ICD-10-CM

## 2022-03-23 DIAGNOSIS — R63.5 WEIGHT GAIN: ICD-10-CM

## 2022-03-23 LAB
ALBUMIN SERPL-MCNC: 4.2 G/DL (ref 3.4–5)
ALBUMIN/GLOB SERPL: 1.3 {RATIO} (ref 1–2)
ALP LIVER SERPL-CCNC: 78 U/L
ALT SERPL-CCNC: 58 U/L
ANION GAP SERPL CALC-SCNC: 4 MMOL/L (ref 0–18)
AST SERPL-CCNC: 22 U/L (ref 15–37)
BASOPHILS # BLD AUTO: 0.08 X10(3) UL (ref 0–0.2)
BASOPHILS NFR BLD AUTO: 1.4 %
BILIRUB SERPL-MCNC: 0.6 MG/DL (ref 0.1–2)
BUN BLD-MCNC: 15 MG/DL (ref 7–18)
CALCIUM BLD-MCNC: 9 MG/DL (ref 8.5–10.1)
CHLORIDE SERPL-SCNC: 109 MMOL/L (ref 98–112)
CHOLEST SERPL-MCNC: 194 MG/DL (ref ?–200)
CO2 SERPL-SCNC: 27 MMOL/L (ref 21–32)
CREAT BLD-MCNC: 0.74 MG/DL
EOSINOPHIL # BLD AUTO: 0.07 X10(3) UL (ref 0–0.7)
EOSINOPHIL NFR BLD AUTO: 1.2 %
ERYTHROCYTE [DISTWIDTH] IN BLOOD BY AUTOMATED COUNT: 13.3 %
FASTING PATIENT LIPID ANSWER: YES
FASTING STATUS PATIENT QL REPORTED: YES
GLOBULIN PLAS-MCNC: 3.2 G/DL (ref 2.8–4.4)
GLUCOSE BLD-MCNC: 96 MG/DL (ref 70–99)
HCT VFR BLD AUTO: 39.6 %
HDLC SERPL-MCNC: 49 MG/DL (ref 40–59)
HGB BLD-MCNC: 12.7 G/DL
IMM GRANULOCYTES # BLD AUTO: 0.02 X10(3) UL (ref 0–1)
IMM GRANULOCYTES NFR BLD: 0.4 %
LDLC SERPL CALC-MCNC: 114 MG/DL (ref ?–100)
LYMPHOCYTES # BLD AUTO: 1.31 X10(3) UL (ref 1–4)
LYMPHOCYTES NFR BLD AUTO: 23 %
MCH RBC QN AUTO: 29.7 PG (ref 26–34)
MCHC RBC AUTO-ENTMCNC: 32.1 G/DL (ref 31–37)
MCV RBC AUTO: 92.5 FL
MONOCYTES # BLD AUTO: 0.54 X10(3) UL (ref 0.1–1)
MONOCYTES NFR BLD AUTO: 9.5 %
NEUTROPHILS # BLD AUTO: 3.68 X10 (3) UL (ref 1.5–7.7)
NEUTROPHILS # BLD AUTO: 3.68 X10(3) UL (ref 1.5–7.7)
NEUTROPHILS NFR BLD AUTO: 64.5 %
NONHDLC SERPL-MCNC: 145 MG/DL (ref ?–130)
OSMOLALITY SERPL CALC.SUM OF ELEC: 291 MOSM/KG (ref 275–295)
PLATELET # BLD AUTO: 269 10(3)UL (ref 150–450)
POTASSIUM SERPL-SCNC: 4.4 MMOL/L (ref 3.5–5.1)
PROT SERPL-MCNC: 7.4 G/DL (ref 6.4–8.2)
RBC # BLD AUTO: 4.28 X10(6)UL
SODIUM SERPL-SCNC: 140 MMOL/L (ref 136–145)
TRIGL SERPL-MCNC: 178 MG/DL (ref 30–149)
TSI SER-ACNC: 1.31 MIU/ML (ref 0.36–3.74)
VLDLC SERPL CALC-MCNC: 31 MG/DL (ref 0–30)
WBC # BLD AUTO: 5.7 X10(3) UL (ref 4–11)

## 2022-03-23 PROCEDURE — 80061 LIPID PANEL: CPT | Performed by: PHYSICIAN ASSISTANT

## 2022-03-23 PROCEDURE — 80050 GENERAL HEALTH PANEL: CPT | Performed by: PHYSICIAN ASSISTANT

## 2022-04-11 ENCOUNTER — OFFICE VISIT (OUTPATIENT)
Dept: INTERNAL MEDICINE CLINIC | Facility: CLINIC | Age: 42
End: 2022-04-11
Payer: COMMERCIAL

## 2022-04-11 VITALS
HEART RATE: 130 BPM | SYSTOLIC BLOOD PRESSURE: 124 MMHG | RESPIRATION RATE: 16 BRPM | DIASTOLIC BLOOD PRESSURE: 76 MMHG | WEIGHT: 153.81 LBS | BODY MASS INDEX: 25.63 KG/M2 | HEIGHT: 65 IN | TEMPERATURE: 98 F | OXYGEN SATURATION: 98 %

## 2022-04-11 DIAGNOSIS — I10 ESSENTIAL HYPERTENSION: Primary | ICD-10-CM

## 2022-04-11 DIAGNOSIS — R00.0 TACHYCARDIA: ICD-10-CM

## 2022-04-11 PROCEDURE — 3078F DIAST BP <80 MM HG: CPT | Performed by: INTERNAL MEDICINE

## 2022-04-11 PROCEDURE — 99213 OFFICE O/P EST LOW 20 MIN: CPT | Performed by: INTERNAL MEDICINE

## 2022-04-11 PROCEDURE — 3008F BODY MASS INDEX DOCD: CPT | Performed by: INTERNAL MEDICINE

## 2022-04-11 PROCEDURE — 93000 ELECTROCARDIOGRAM COMPLETE: CPT | Performed by: INTERNAL MEDICINE

## 2022-04-11 PROCEDURE — 3074F SYST BP LT 130 MM HG: CPT | Performed by: INTERNAL MEDICINE

## 2022-07-20 ENCOUNTER — OFFICE VISIT (OUTPATIENT)
Dept: INTERNAL MEDICINE CLINIC | Facility: CLINIC | Age: 42
End: 2022-07-20
Payer: COMMERCIAL

## 2022-07-20 ENCOUNTER — TELEPHONE (OUTPATIENT)
Dept: INTERNAL MEDICINE CLINIC | Facility: CLINIC | Age: 42
End: 2022-07-20

## 2022-07-20 VITALS
DIASTOLIC BLOOD PRESSURE: 78 MMHG | RESPIRATION RATE: 16 BRPM | WEIGHT: 155.38 LBS | BODY MASS INDEX: 25.89 KG/M2 | HEART RATE: 98 BPM | TEMPERATURE: 98 F | SYSTOLIC BLOOD PRESSURE: 112 MMHG | HEIGHT: 65 IN | OXYGEN SATURATION: 99 %

## 2022-07-20 DIAGNOSIS — I10 ESSENTIAL HYPERTENSION: ICD-10-CM

## 2022-07-20 DIAGNOSIS — Z00.00 ENCOUNTER FOR ROUTINE ADULT MEDICAL EXAMINATION: Primary | ICD-10-CM

## 2022-07-20 PROCEDURE — 99396 PREV VISIT EST AGE 40-64: CPT | Performed by: INTERNAL MEDICINE

## 2022-07-20 PROCEDURE — 3008F BODY MASS INDEX DOCD: CPT | Performed by: INTERNAL MEDICINE

## 2022-07-20 PROCEDURE — 3074F SYST BP LT 130 MM HG: CPT | Performed by: INTERNAL MEDICINE

## 2022-07-20 PROCEDURE — 3078F DIAST BP <80 MM HG: CPT | Performed by: INTERNAL MEDICINE

## 2022-07-20 RX ORDER — AMLODIPINE BESYLATE 10 MG/1
10 TABLET ORAL DAILY
Qty: 90 TABLET | Refills: 3 | Status: SHIPPED | OUTPATIENT
Start: 2022-07-20

## 2022-07-20 RX ORDER — VALSARTAN 80 MG/1
80 TABLET ORAL DAILY
Qty: 90 TABLET | Refills: 3 | Status: SHIPPED | OUTPATIENT
Start: 2022-07-20

## 2022-07-20 NOTE — TELEPHONE ENCOUNTER
Health screening form complete at [address] today   Faxed to [facility] at [phone] with receipt of confirmation   Sent to scan and copy placed in accordion

## 2022-11-30 ENCOUNTER — PATIENT MESSAGE (OUTPATIENT)
Dept: INTERNAL MEDICINE CLINIC | Facility: CLINIC | Age: 42
End: 2022-11-30

## 2022-11-30 DIAGNOSIS — Z20.822 EXPOSURE TO COVID-19 VIRUS: Primary | ICD-10-CM

## 2022-11-30 NOTE — TELEPHONE ENCOUNTER
From: Nusrat Clayton  To: Jr Huitron MD  Sent: 11/30/2022 11:14 AM CST  Subject: Need prescription for at home covid tests    Hello,    My  tested positive for covid yesterday. I tested negative and am feeling fine. I wanted to purchase binax at home tests from the pharmacy but [facility] said they needed a prescription from my PCP in order for our insurance to cover the cost. I would like to stock up on tests so we are able to test at home.      Thank you,  Jong Hernadez

## 2023-02-10 ENCOUNTER — HOSPITAL ENCOUNTER (OUTPATIENT)
Dept: MAMMOGRAPHY | Age: 43
Discharge: HOME | End: 2023-02-10
Attending: STUDENT IN AN ORGANIZED HEALTH CARE EDUCATION/TRAINING PROGRAM
Payer: COMMERCIAL

## 2023-02-10 PROCEDURE — 77063 BREAST TOMOSYNTHESIS BI: CPT | Performed by: STUDENT IN AN ORGANIZED HEALTH CARE EDUCATION/TRAINING PROGRAM

## 2023-02-10 PROCEDURE — 77067 SCR MAMMO BI INCL CAD: CPT | Performed by: STUDENT IN AN ORGANIZED HEALTH CARE EDUCATION/TRAINING PROGRAM

## 2023-07-24 ENCOUNTER — OFFICE VISIT (OUTPATIENT)
Dept: INTERNAL MEDICINE CLINIC | Facility: CLINIC | Age: 43
End: 2023-07-24
Payer: COMMERCIAL

## 2023-07-24 VITALS
OXYGEN SATURATION: 100 % | SYSTOLIC BLOOD PRESSURE: 110 MMHG | TEMPERATURE: 98 F | BODY MASS INDEX: 25.09 KG/M2 | HEART RATE: 98 BPM | HEIGHT: 65 IN | WEIGHT: 150.63 LBS | RESPIRATION RATE: 16 BRPM | DIASTOLIC BLOOD PRESSURE: 64 MMHG

## 2023-07-24 DIAGNOSIS — L65.9 HAIR LOSS: ICD-10-CM

## 2023-07-24 DIAGNOSIS — Z00.00 ENCOUNTER FOR ROUTINE ADULT MEDICAL EXAMINATION: Primary | ICD-10-CM

## 2023-07-24 DIAGNOSIS — I10 ESSENTIAL HYPERTENSION: ICD-10-CM

## 2023-07-24 DIAGNOSIS — Z00.00 LABORATORY EXAM ORDERED AS PART OF ROUTINE GENERAL MEDICAL EXAMINATION: ICD-10-CM

## 2023-07-24 PROCEDURE — 3078F DIAST BP <80 MM HG: CPT | Performed by: INTERNAL MEDICINE

## 2023-07-24 PROCEDURE — 3008F BODY MASS INDEX DOCD: CPT | Performed by: INTERNAL MEDICINE

## 2023-07-24 PROCEDURE — 3074F SYST BP LT 130 MM HG: CPT | Performed by: INTERNAL MEDICINE

## 2023-07-24 PROCEDURE — 99396 PREV VISIT EST AGE 40-64: CPT | Performed by: INTERNAL MEDICINE

## 2023-07-24 RX ORDER — VALSARTAN 80 MG/1
80 TABLET ORAL DAILY
Qty: 90 TABLET | Refills: 3 | Status: SHIPPED | OUTPATIENT
Start: 2023-07-24 | End: 2023-07-24

## 2023-07-24 RX ORDER — AMLODIPINE BESYLATE 10 MG/1
10 TABLET ORAL DAILY
Qty: 90 TABLET | Refills: 3 | Status: SHIPPED | OUTPATIENT
Start: 2023-07-24 | End: 2023-07-24

## 2023-07-24 RX ORDER — VALSARTAN 160 MG/1
160 TABLET ORAL DAILY
Qty: 90 TABLET | Refills: 1 | Status: SHIPPED | OUTPATIENT
Start: 2023-07-24

## 2023-07-24 NOTE — PATIENT INSTRUCTIONS
Stop amlodipine    Start valsartan 160mg daily (ok to take 2 tablets of 80mg to use up what you have). Goal blood pressure is 110-140 for the top number (systolic). Goal is below 90 for the bottom number (diastolic).

## 2023-07-31 ENCOUNTER — LAB ENCOUNTER (OUTPATIENT)
Dept: LAB | Age: 43
End: 2023-07-31
Attending: INTERNAL MEDICINE
Payer: COMMERCIAL

## 2023-07-31 DIAGNOSIS — Z00.00 LABORATORY EXAM ORDERED AS PART OF ROUTINE GENERAL MEDICAL EXAMINATION: ICD-10-CM

## 2023-07-31 DIAGNOSIS — L65.9 HAIR LOSS: ICD-10-CM

## 2023-07-31 LAB
ALBUMIN SERPL-MCNC: 3.7 G/DL (ref 3.4–5)
ALBUMIN/GLOB SERPL: 1 {RATIO} (ref 1–2)
ALP LIVER SERPL-CCNC: 75 U/L
ALT SERPL-CCNC: 42 U/L
ANION GAP SERPL CALC-SCNC: 4 MMOL/L (ref 0–18)
AST SERPL-CCNC: 17 U/L (ref 15–37)
BASOPHILS # BLD AUTO: 0.08 X10(3) UL (ref 0–0.2)
BASOPHILS NFR BLD AUTO: 1.2 %
BILIRUB SERPL-MCNC: 0.3 MG/DL (ref 0.1–2)
BUN BLD-MCNC: 14 MG/DL (ref 7–18)
CALCIUM BLD-MCNC: 8.7 MG/DL (ref 8.5–10.1)
CHLORIDE SERPL-SCNC: 112 MMOL/L (ref 98–112)
CHOLEST SERPL-MCNC: 203 MG/DL (ref ?–200)
CO2 SERPL-SCNC: 22 MMOL/L (ref 21–32)
CREAT BLD-MCNC: 0.8 MG/DL
DEPRECATED HBV CORE AB SER IA-ACNC: 2.5 NG/ML
EGFRCR SERPLBLD CKD-EPI 2021: 94 ML/MIN/1.73M2 (ref 60–?)
EOSINOPHIL # BLD AUTO: 0.09 X10(3) UL (ref 0–0.7)
EOSINOPHIL NFR BLD AUTO: 1.3 %
ERYTHROCYTE [DISTWIDTH] IN BLOOD BY AUTOMATED COUNT: 17.4 %
FASTING PATIENT LIPID ANSWER: YES
FASTING STATUS PATIENT QL REPORTED: YES
GLOBULIN PLAS-MCNC: 3.8 G/DL (ref 2.8–4.4)
GLUCOSE BLD-MCNC: 89 MG/DL (ref 70–99)
HCT VFR BLD AUTO: 34.3 %
HDLC SERPL-MCNC: 52 MG/DL (ref 40–59)
HGB BLD-MCNC: 9.8 G/DL
IMM GRANULOCYTES # BLD AUTO: 0.03 X10(3) UL (ref 0–1)
IMM GRANULOCYTES NFR BLD: 0.4 %
IRON SATN MFR SERPL: 4 %
IRON SERPL-MCNC: 22 UG/DL
LDLC SERPL CALC-MCNC: 123 MG/DL (ref ?–100)
LYMPHOCYTES # BLD AUTO: 1.78 X10(3) UL (ref 1–4)
LYMPHOCYTES NFR BLD AUTO: 26.3 %
MCH RBC QN AUTO: 22.7 PG (ref 26–34)
MCHC RBC AUTO-ENTMCNC: 28.6 G/DL (ref 31–37)
MCV RBC AUTO: 79.4 FL
MONOCYTES # BLD AUTO: 0.58 X10(3) UL (ref 0.1–1)
MONOCYTES NFR BLD AUTO: 8.6 %
NEUTROPHILS # BLD AUTO: 4.21 X10 (3) UL (ref 1.5–7.7)
NEUTROPHILS # BLD AUTO: 4.21 X10(3) UL (ref 1.5–7.7)
NEUTROPHILS NFR BLD AUTO: 62.2 %
NONHDLC SERPL-MCNC: 151 MG/DL (ref ?–130)
OSMOLALITY SERPL CALC.SUM OF ELEC: 286 MOSM/KG (ref 275–295)
PLATELET # BLD AUTO: 347 10(3)UL (ref 150–450)
POTASSIUM SERPL-SCNC: 3.9 MMOL/L (ref 3.5–5.1)
PROT SERPL-MCNC: 7.5 G/DL (ref 6.4–8.2)
RBC # BLD AUTO: 4.32 X10(6)UL
SODIUM SERPL-SCNC: 138 MMOL/L (ref 136–145)
TIBC SERPL-MCNC: 608 UG/DL (ref 240–450)
TRANSFERRIN SERPL-MCNC: 408 MG/DL (ref 200–360)
TRIGL SERPL-MCNC: 160 MG/DL (ref 30–149)
TSI SER-ACNC: 1.94 MIU/ML (ref 0.36–3.74)
VLDLC SERPL CALC-MCNC: 28 MG/DL (ref 0–30)
WBC # BLD AUTO: 6.8 X10(3) UL (ref 4–11)

## 2023-07-31 PROCEDURE — 85025 COMPLETE CBC W/AUTO DIFF WBC: CPT

## 2023-07-31 PROCEDURE — 84443 ASSAY THYROID STIM HORMONE: CPT

## 2023-07-31 PROCEDURE — 83550 IRON BINDING TEST: CPT

## 2023-07-31 PROCEDURE — 83540 ASSAY OF IRON: CPT

## 2023-07-31 PROCEDURE — 82728 ASSAY OF FERRITIN: CPT

## 2023-07-31 PROCEDURE — 80053 COMPREHEN METABOLIC PANEL: CPT

## 2023-07-31 PROCEDURE — 80061 LIPID PANEL: CPT

## 2023-08-01 ENCOUNTER — PATIENT MESSAGE (OUTPATIENT)
Dept: INTERNAL MEDICINE CLINIC | Facility: CLINIC | Age: 43
End: 2023-08-01

## 2023-08-01 DIAGNOSIS — D50.9 IRON DEFICIENCY ANEMIA, UNSPECIFIED IRON DEFICIENCY ANEMIA TYPE: Primary | ICD-10-CM

## 2023-08-02 NOTE — TELEPHONE ENCOUNTER
From: Alee Massey  To: Pedro Lemon MD  Sent: 8/1/2023 9:58 PM CDT  Subject: Follow up on blood pressure readings    Morgan Ogden,    Here are my bp readings since my appt. You wanted me to check in with the medication change. 7/26 135/84  7/27 115/82  7/30 129/82  8/1 122/84    My lab results are in and I wanted to go over them since it looks like I have an iron deficiency. Looks like my liver enzymes are in normal range.      Thanks,  Yumiko Srinivasan

## 2023-08-07 RX ORDER — PNV NO.95/FERROUS FUM/FOLIC AC 28MG-0.8MG
1 TABLET ORAL 2 TIMES DAILY
Qty: 30 TABLET | Refills: 0 | COMMUNITY
Start: 2023-08-07

## 2023-08-07 NOTE — TELEPHONE ENCOUNTER
LS - reply from the pt to your message.  I have pended iron supplement as historical addition to medication list. Ty!

## 2023-08-24 ENCOUNTER — OFFICE VISIT (OUTPATIENT)
Dept: OBGYN CLINIC | Facility: CLINIC | Age: 43
End: 2023-08-24

## 2023-08-24 VITALS
HEIGHT: 65 IN | DIASTOLIC BLOOD PRESSURE: 86 MMHG | BODY MASS INDEX: 25.64 KG/M2 | WEIGHT: 153.88 LBS | SYSTOLIC BLOOD PRESSURE: 134 MMHG

## 2023-08-24 DIAGNOSIS — Z01.419 ENCOUNTER FOR WELL WOMAN EXAM WITH ROUTINE GYNECOLOGICAL EXAM: Primary | ICD-10-CM

## 2023-08-24 DIAGNOSIS — R92.2 DENSE BREAST: ICD-10-CM

## 2023-08-24 DIAGNOSIS — N92.0 MENORRHAGIA WITH REGULAR CYCLE: ICD-10-CM

## 2023-08-24 DIAGNOSIS — D50.0 IRON DEFICIENCY ANEMIA DUE TO CHRONIC BLOOD LOSS: ICD-10-CM

## 2023-08-24 PROCEDURE — 99386 PREV VISIT NEW AGE 40-64: CPT | Performed by: OBSTETRICS & GYNECOLOGY

## 2023-08-24 PROCEDURE — 3079F DIAST BP 80-89 MM HG: CPT | Performed by: OBSTETRICS & GYNECOLOGY

## 2023-08-24 PROCEDURE — 3075F SYST BP GE 130 - 139MM HG: CPT | Performed by: OBSTETRICS & GYNECOLOGY

## 2023-08-24 PROCEDURE — 3008F BODY MASS INDEX DOCD: CPT | Performed by: OBSTETRICS & GYNECOLOGY

## 2023-08-30 LAB — HPV I/H RISK 1 DNA SPEC QL NAA+PROBE: NEGATIVE

## 2023-10-11 ENCOUNTER — LAB ENCOUNTER (OUTPATIENT)
Dept: LAB | Age: 43
End: 2023-10-11
Attending: INTERNAL MEDICINE
Payer: COMMERCIAL

## 2023-10-11 DIAGNOSIS — D50.9 IRON DEFICIENCY ANEMIA, UNSPECIFIED IRON DEFICIENCY ANEMIA TYPE: ICD-10-CM

## 2023-10-11 LAB
BASOPHILS # BLD AUTO: 0.08 X10(3) UL (ref 0–0.2)
BASOPHILS NFR BLD AUTO: 1.1 %
DEPRECATED HBV CORE AB SER IA-ACNC: 22.8 NG/ML
EOSINOPHIL # BLD AUTO: 0.1 X10(3) UL (ref 0–0.7)
EOSINOPHIL NFR BLD AUTO: 1.4 %
ERYTHROCYTE [DISTWIDTH] IN BLOOD BY AUTOMATED COUNT: 20.4 %
HCT VFR BLD AUTO: 40.9 %
HGB BLD-MCNC: 12.9 G/DL
IMM GRANULOCYTES # BLD AUTO: 0.02 X10(3) UL (ref 0–1)
IMM GRANULOCYTES NFR BLD: 0.3 %
IRON SATN MFR SERPL: 22 %
IRON SERPL-MCNC: 109 UG/DL
LYMPHOCYTES # BLD AUTO: 1.44 X10(3) UL (ref 1–4)
LYMPHOCYTES NFR BLD AUTO: 19.7 %
MCH RBC QN AUTO: 27.2 PG (ref 26–34)
MCHC RBC AUTO-ENTMCNC: 31.5 G/DL (ref 31–37)
MCV RBC AUTO: 86.3 FL
MONOCYTES # BLD AUTO: 0.53 X10(3) UL (ref 0.1–1)
MONOCYTES NFR BLD AUTO: 7.2 %
NEUTROPHILS # BLD AUTO: 5.15 X10 (3) UL (ref 1.5–7.7)
NEUTROPHILS # BLD AUTO: 5.15 X10(3) UL (ref 1.5–7.7)
NEUTROPHILS NFR BLD AUTO: 70.3 %
PLATELET # BLD AUTO: 300 10(3)UL (ref 150–450)
RBC # BLD AUTO: 4.74 X10(6)UL
TIBC SERPL-MCNC: 504 UG/DL (ref 240–450)
TRANSFERRIN SERPL-MCNC: 338 MG/DL (ref 200–360)
WBC # BLD AUTO: 7.3 X10(3) UL (ref 4–11)

## 2023-10-11 PROCEDURE — 85025 COMPLETE CBC W/AUTO DIFF WBC: CPT

## 2023-10-11 PROCEDURE — 82728 ASSAY OF FERRITIN: CPT

## 2023-10-11 PROCEDURE — 83550 IRON BINDING TEST: CPT

## 2023-10-11 PROCEDURE — 83540 ASSAY OF IRON: CPT

## 2023-10-16 ENCOUNTER — OFFICE VISIT (OUTPATIENT)
Dept: INTERNAL MEDICINE CLINIC | Facility: CLINIC | Age: 43
End: 2023-10-16
Payer: COMMERCIAL

## 2023-10-16 VITALS
HEIGHT: 65 IN | OXYGEN SATURATION: 99 % | HEART RATE: 92 BPM | WEIGHT: 154 LBS | RESPIRATION RATE: 16 BRPM | DIASTOLIC BLOOD PRESSURE: 80 MMHG | TEMPERATURE: 98 F | BODY MASS INDEX: 25.66 KG/M2 | SYSTOLIC BLOOD PRESSURE: 130 MMHG

## 2023-10-16 DIAGNOSIS — I10 ESSENTIAL HYPERTENSION: Primary | ICD-10-CM

## 2023-10-16 PROCEDURE — 99213 OFFICE O/P EST LOW 20 MIN: CPT | Performed by: INTERNAL MEDICINE

## 2023-10-16 PROCEDURE — 3008F BODY MASS INDEX DOCD: CPT | Performed by: INTERNAL MEDICINE

## 2023-10-16 PROCEDURE — 3075F SYST BP GE 130 - 139MM HG: CPT | Performed by: INTERNAL MEDICINE

## 2023-10-16 PROCEDURE — 3079F DIAST BP 80-89 MM HG: CPT | Performed by: INTERNAL MEDICINE

## 2024-02-09 RX ORDER — VALSARTAN 160 MG/1
160 TABLET ORAL DAILY
Qty: 90 TABLET | Refills: 1 | Status: SHIPPED | OUTPATIENT
Start: 2024-02-09

## 2024-02-16 ENCOUNTER — HOSPITAL ENCOUNTER (OUTPATIENT)
Dept: MAMMOGRAPHY | Age: 44
Discharge: HOME | End: 2024-02-16
Attending: OBSTETRICS & GYNECOLOGY
Payer: COMMERCIAL

## 2024-02-16 DIAGNOSIS — Z12.31 ENCOUNTER FOR SCREENING MAMMOGRAM FOR MALIGNANT NEOPLASM OF BREAST: ICD-10-CM

## 2024-02-16 PROCEDURE — 77063 BREAST TOMOSYNTHESIS BI: CPT | Performed by: OBSTETRICS & GYNECOLOGY

## 2024-02-16 PROCEDURE — 77067 SCR MAMMO BI INCL CAD: CPT | Performed by: OBSTETRICS & GYNECOLOGY

## 2024-06-03 ENCOUNTER — HOSPITAL ENCOUNTER (OUTPATIENT)
Dept: MAMMOGRAPHY | Facility: HOSPITAL | Age: 44
Discharge: HOME | End: 2024-06-03
Attending: OBSTETRICS & GYNECOLOGY
Payer: COMMERCIAL

## 2024-06-03 DIAGNOSIS — R92.30 DENSE BREAST: ICD-10-CM

## 2024-06-03 PROCEDURE — 76641 ULTRASOUND BREAST COMPLETE: CPT | Performed by: OBSTETRICS & GYNECOLOGY

## 2024-08-02 ENCOUNTER — OFFICE VISIT (OUTPATIENT)
Dept: INTERNAL MEDICINE CLINIC | Facility: CLINIC | Age: 44
End: 2024-08-02
Payer: COMMERCIAL

## 2024-08-02 VITALS
TEMPERATURE: 98 F | RESPIRATION RATE: 16 BRPM | WEIGHT: 157 LBS | DIASTOLIC BLOOD PRESSURE: 84 MMHG | HEART RATE: 84 BPM | SYSTOLIC BLOOD PRESSURE: 122 MMHG | BODY MASS INDEX: 26.16 KG/M2 | HEIGHT: 65 IN | OXYGEN SATURATION: 97 %

## 2024-08-02 DIAGNOSIS — Z00.00 ENCOUNTER FOR ROUTINE ADULT MEDICAL EXAMINATION: Primary | ICD-10-CM

## 2024-08-02 DIAGNOSIS — I10 ESSENTIAL HYPERTENSION: ICD-10-CM

## 2024-08-02 DIAGNOSIS — D50.9 IRON DEFICIENCY ANEMIA, UNSPECIFIED IRON DEFICIENCY ANEMIA TYPE: ICD-10-CM

## 2024-08-02 DIAGNOSIS — Z00.00 LABORATORY EXAM ORDERED AS PART OF ROUTINE GENERAL MEDICAL EXAMINATION: ICD-10-CM

## 2024-08-02 PROCEDURE — 3008F BODY MASS INDEX DOCD: CPT | Performed by: INTERNAL MEDICINE

## 2024-08-02 PROCEDURE — 3079F DIAST BP 80-89 MM HG: CPT | Performed by: INTERNAL MEDICINE

## 2024-08-02 PROCEDURE — 3074F SYST BP LT 130 MM HG: CPT | Performed by: INTERNAL MEDICINE

## 2024-08-02 PROCEDURE — 99396 PREV VISIT EST AGE 40-64: CPT | Performed by: INTERNAL MEDICINE

## 2024-08-02 RX ORDER — VALSARTAN 160 MG/1
160 TABLET ORAL DAILY
Qty: 90 TABLET | Refills: 3 | Status: SHIPPED | OUTPATIENT
Start: 2024-08-02

## 2024-08-02 NOTE — PROGRESS NOTES
[facility] Internal Medicine Office Note  Chief Complaint:   Chief Complaint   Patient presents with    Physical       HPI:   This is a 43 year old female coming in for physical  HPI    She has a history of iron deficiency due to heavy periods  This year periods are not heavy    HTN - well controlled on valsartan 160mg   /84 today    Pap smear due in 8/2026    She note she has insomnia sometimes   Hot flashes that are happening more regularly   She plans to talk with her gynecologist         Patient Active Problem List   Diagnosis    Essential hypertension    Migraine without aura and without status migrainosus, not intractable     Past Surgical History:   Procedure Laterality Date    [place] teeth removed  2012     Family History   Problem Relation Age of Onset    Hypertension Mother     Psoriasis Mother     Lipids Mother     Other (osteoporosis) Mother     Cancer Father         Lung and brain    Hypertension Brother     Lipids Brother     Other (Other) Brother         thyroid disorder        I reviewed her's Past Medical History, Past Surgical History, Family History and   Social History updated shows  Social History     Socioeconomic History    Marital status:    Tobacco Use    Smoking status: Never     Passive exposure: Never    Smokeless tobacco: Never   Vaping Use    Vaping status: Never Used   Substance and Sexual Activity    Alcohol use: No    Drug use: No     Social Determinants of Health      Received from [facility]     Allergies:  Allergies   Allergen Reactions    Seasonal Coughing and Runny nose     Current Outpatient Medications   Medication Sig Dispense Refill    VALSARTAN 160 MG Oral Tab TAKE 1 TABLET BY MOUTH EVERY DAY 90 tablet 1    Iron-Vitamin C  MG Oral Tab       Multiple Minerals-Vitamins (CITRACAL MAXIMUM PLUS) Oral Tab Take 1 capsule by mouth daily.             REVIEW OF SYSTEMS:   Review of Systems   Constitutional:  Negative for fever.   HENT:   Negative for congestion.    Eyes:  Negative for visual disturbance.   Respiratory:  Negative for shortness of breath.    Cardiovascular:  Negative for chest pain.   Gastrointestinal:  Negative for constipation.   Genitourinary:  Negative for dysuria.   Neurological: Negative.    Hematological: Negative.    Psychiatric/Behavioral: Negative.          EXAM:   /84   Pulse 84   Temp 98.1 °F (36.7 °C) (Temporal)   Resp 16   Ht 5' 5\" (1.651 m)   Wt 157 lb (71.2 kg)   LMP 07/08/2024 (Exact Date)   SpO2 97%   BMI 26.13 kg/m²  Estimated body mass index is 26.13 kg/m² as calculated from the following:    Height as of this encounter: 5' 5\" (1.651 m).    Weight as of this encounter: 157 lb (71.2 kg).   Vital signs reviewed. Appears stated age, well groomed.  Physical Exam  Vitals reviewed.   Constitutional:       General: She is not in acute distress.     Appearance: She is well-developed.   HENT:      Head: Normocephalic and atraumatic.      Right Ear: Tympanic membrane normal.      Left Ear: Tympanic membrane normal.   Eyes:      Conjunctiva/sclera: Conjunctivae normal.   Cardiovascular:      Rate and Rhythm: Normal rate and regular rhythm.      Heart sounds: Normal heart sounds.   Pulmonary:      Effort: Pulmonary effort is normal.      Breath sounds: Normal breath sounds.   Abdominal:      Palpations: Abdomen is soft.      Tenderness: There is no abdominal tenderness.   Musculoskeletal:      Cervical back: Neck supple.      Right lower leg: No edema.      Left lower leg: No edema.   Lymphadenopathy:      Cervical: No cervical adenopathy.   Skin:     General: Skin is warm and dry.   Neurological:      General: No focal deficit present.      Mental Status: She is alert.   Psychiatric:         Mood and Affect: Mood normal.          ASSESSMENT AND PLAN:   Smua Cox is a 43 year old female with  1. Encounter for routine adult medical examination    2. Essential hypertension    3. Laboratory exam ordered as  part of routine general medical examination    4. Iron deficiency anemia, unspecified iron deficiency anemia type          The plan is as follows  Suma was seen today for physical.    Diagnoses and all orders for this visit:    Encounter for routine adult medical examination  -pap smear and mammo up to date   -flu and covid vaccine this fall  -Tdap due in 2028  -cont exercise on most days of the week    Essential hypertension -at goal; cont present management with valsartan 160mg daily    Laboratory exam ordered as part of routine general medical examination  -     CBC With Differential With Platelet; Future  -     Comp Metabolic Panel (14); Future  -     Lipid Panel; Future  -     TSH W Reflex To Free T4; Future    Iron deficiency anemia, unspecified iron deficiency anemia type -check iron levels. Heavy periods have improved   -     Ferritin [E]; Future  -     Iron And Tibc [E]; Future        Orders Placed This Encounter   Procedures    CBC With Differential With Platelet    Comp Metabolic Panel (14)    Lipid Panel    TSH W Reflex To Free T4    Ferritin [E]    Iron And Tibc [E]       Meds & Refills for this Visit:  Requested Prescriptions      No prescriptions requested or ordered in this encounter       Imaging & Consults:  None    Health Maintenance Due   Topic Date Due    Annual Physical  07/24/2024     Patient/Caregiver Education: Patient/Caregiver Education: There are no barriers to learning. Medical education done. Outcome: Patient verbalizes understanding. Patient is notified to call with any questions, complications, allergies, or worsening or changing symptoms.  Patient is to call with any side effects or complications from the treatments as a result of today.     Kim Hemphill MD

## 2024-08-06 ENCOUNTER — LAB ENCOUNTER (OUTPATIENT)
Dept: LAB | Age: 44
End: 2024-08-06
Attending: INTERNAL MEDICINE
Payer: COMMERCIAL

## 2024-08-06 DIAGNOSIS — D50.9 IRON DEFICIENCY ANEMIA, UNSPECIFIED IRON DEFICIENCY ANEMIA TYPE: ICD-10-CM

## 2024-08-06 DIAGNOSIS — Z00.00 LABORATORY EXAM ORDERED AS PART OF ROUTINE GENERAL MEDICAL EXAMINATION: ICD-10-CM

## 2024-08-06 LAB
ALBUMIN SERPL-MCNC: 4.6 G/DL (ref 3.2–4.8)
ALBUMIN/GLOB SERPL: 1.5 {RATIO} (ref 1–2)
ALP LIVER SERPL-CCNC: 97 U/L
ALT SERPL-CCNC: 88 U/L
ANION GAP SERPL CALC-SCNC: 9 MMOL/L (ref 0–18)
AST SERPL-CCNC: 47 U/L (ref ?–34)
BASOPHILS # BLD AUTO: 0.08 X10(3) UL (ref 0–0.2)
BASOPHILS NFR BLD AUTO: 1.1 %
BILIRUB SERPL-MCNC: 0.4 MG/DL (ref 0.3–1.2)
BUN BLD-MCNC: 10 MG/DL (ref 9–23)
CALCIUM BLD-MCNC: 9.6 MG/DL (ref 8.7–10.4)
CHLORIDE SERPL-SCNC: 105 MMOL/L (ref 98–112)
CHOLEST SERPL-MCNC: 233 MG/DL (ref ?–200)
CO2 SERPL-SCNC: 23 MMOL/L (ref 21–32)
CREAT BLD-MCNC: 0.75 MG/DL
DEPRECATED HBV CORE AB SER IA-ACNC: 24.8 NG/ML
EGFRCR SERPLBLD CKD-EPI 2021: 101 ML/MIN/1.73M2 (ref 60–?)
EOSINOPHIL # BLD AUTO: 0.14 X10(3) UL (ref 0–0.7)
EOSINOPHIL NFR BLD AUTO: 1.9 %
ERYTHROCYTE [DISTWIDTH] IN BLOOD BY AUTOMATED COUNT: 14.6 %
FASTING PATIENT LIPID ANSWER: YES
FASTING STATUS PATIENT QL REPORTED: YES
GLOBULIN PLAS-MCNC: 3 G/DL (ref 2–3.5)
GLUCOSE BLD-MCNC: 94 MG/DL (ref 70–99)
HCT VFR BLD AUTO: 44.1 %
HDLC SERPL-MCNC: 50 MG/DL (ref 40–59)
HGB BLD-MCNC: 14.7 G/DL
IMM GRANULOCYTES # BLD AUTO: 0.06 X10(3) UL (ref 0–1)
IMM GRANULOCYTES NFR BLD: 0.8 %
IRON SATN MFR SERPL: 25 %
IRON SERPL-MCNC: 106 UG/DL
LDLC SERPL CALC-MCNC: 140 MG/DL (ref ?–100)
LYMPHOCYTES # BLD AUTO: 1.72 X10(3) UL (ref 1–4)
LYMPHOCYTES NFR BLD AUTO: 23.7 %
MCH RBC QN AUTO: 29.3 PG (ref 26–34)
MCHC RBC AUTO-ENTMCNC: 33.3 G/DL (ref 31–37)
MCV RBC AUTO: 87.8 FL
MONOCYTES # BLD AUTO: 0.59 X10(3) UL (ref 0.1–1)
MONOCYTES NFR BLD AUTO: 8.1 %
NEUTROPHILS # BLD AUTO: 4.67 X10 (3) UL (ref 1.5–7.7)
NEUTROPHILS # BLD AUTO: 4.67 X10(3) UL (ref 1.5–7.7)
NEUTROPHILS NFR BLD AUTO: 64.4 %
NONHDLC SERPL-MCNC: 183 MG/DL (ref ?–130)
OSMOLALITY SERPL CALC.SUM OF ELEC: 283 MOSM/KG (ref 275–295)
PLATELET # BLD AUTO: 210 10(3)UL (ref 150–450)
POTASSIUM SERPL-SCNC: 3.9 MMOL/L (ref 3.5–5.1)
PROT SERPL-MCNC: 7.6 G/DL (ref 5.7–8.2)
RBC # BLD AUTO: 5.02 X10(6)UL
SODIUM SERPL-SCNC: 137 MMOL/L (ref 136–145)
TOTAL IRON BINDING CAPACITY: 429 UG/DL (ref 250–425)
TRANSFERRIN SERPL-MCNC: 343 MG/DL (ref 250–380)
TRIGL SERPL-MCNC: 238 MG/DL (ref 30–149)
TSI SER-ACNC: 1.82 MIU/ML (ref 0.55–4.78)
VLDLC SERPL CALC-MCNC: 45 MG/DL (ref 0–30)
WBC # BLD AUTO: 7.3 X10(3) UL (ref 4–11)

## 2024-08-06 PROCEDURE — 83550 IRON BINDING TEST: CPT | Performed by: INTERNAL MEDICINE

## 2024-08-06 PROCEDURE — 80061 LIPID PANEL: CPT | Performed by: INTERNAL MEDICINE

## 2024-08-06 PROCEDURE — 82728 ASSAY OF FERRITIN: CPT | Performed by: INTERNAL MEDICINE

## 2024-08-06 PROCEDURE — 83540 ASSAY OF IRON: CPT | Performed by: INTERNAL MEDICINE

## 2024-08-06 PROCEDURE — 80050 GENERAL HEALTH PANEL: CPT | Performed by: INTERNAL MEDICINE

## 2024-08-07 ENCOUNTER — TELEPHONE (OUTPATIENT)
Dept: INTERNAL MEDICINE CLINIC | Facility: CLINIC | Age: 44
End: 2024-08-07

## 2024-08-07 DIAGNOSIS — R74.8 ELEVATED LIVER ENZYMES: Primary | ICD-10-CM

## 2024-08-08 NOTE — TELEPHONE ENCOUNTER
completed and faxed to [facility]   Confirmation received   Sent to scan and copy placed in accordion

## 2024-08-12 ENCOUNTER — PATIENT MESSAGE (OUTPATIENT)
Dept: INTERNAL MEDICINE CLINIC | Facility: CLINIC | Age: 44
End: 2024-08-12

## 2024-08-13 NOTE — TELEPHONE ENCOUNTER
From: Suma Cox  To: Kim Hemphill  Sent: 8/12/2024 11:44 AM CDT  Subject: Health screening form    Hello, I wanted to confirm that my health screening form was completed and sent to [facility]. Thank you in advance for your follow-up.    - Suma Cox

## 2024-08-27 ENCOUNTER — OFFICE VISIT (OUTPATIENT)
Dept: OBGYN CLINIC | Facility: CLINIC | Age: 44
End: 2024-08-27
Payer: COMMERCIAL

## 2024-08-27 ENCOUNTER — TELEPHONE (OUTPATIENT)
Dept: OBGYN CLINIC | Facility: CLINIC | Age: 44
End: 2024-08-27

## 2024-08-27 VITALS
BODY MASS INDEX: 25.69 KG/M2 | DIASTOLIC BLOOD PRESSURE: 93 MMHG | SYSTOLIC BLOOD PRESSURE: 156 MMHG | WEIGHT: 154.19 LBS | HEIGHT: 65 IN

## 2024-08-27 DIAGNOSIS — G47.9 SLEEP DISTURBANCES: ICD-10-CM

## 2024-08-27 DIAGNOSIS — R23.2 HOT FLASHES: ICD-10-CM

## 2024-08-27 DIAGNOSIS — Z01.419 ENCOUNTER FOR WELL WOMAN EXAM WITH ROUTINE GYNECOLOGICAL EXAM: Primary | ICD-10-CM

## 2024-08-27 PROCEDURE — 99396 PREV VISIT EST AGE 40-64: CPT | Performed by: OBSTETRICS & GYNECOLOGY

## 2024-08-27 PROCEDURE — 3077F SYST BP >= 140 MM HG: CPT | Performed by: OBSTETRICS & GYNECOLOGY

## 2024-08-27 PROCEDURE — 3008F BODY MASS INDEX DOCD: CPT | Performed by: OBSTETRICS & GYNECOLOGY

## 2024-08-27 PROCEDURE — 3080F DIAST BP >= 90 MM HG: CPT | Performed by: OBSTETRICS & GYNECOLOGY

## 2024-08-27 RX ORDER — LEVONORGESTREL/ETHINYL ESTRADIOL 2.6; 2.3 MG/1; MG/1
1 PATCH TRANSDERMAL WEEKLY
Qty: 9 PATCH | Refills: 4 | Status: SHIPPED | OUTPATIENT
Start: 2024-08-27 | End: 2024-09-24

## 2024-08-27 RX ORDER — LEVONORGESTREL/ETHINYL ESTRADIOL 2.6; 2.3 MG/1; MG/1
1 PATCH TRANSDERMAL WEEKLY
Qty: 6 PATCH | Refills: 0 | COMMUNITY
Start: 2024-08-27 | End: 2024-09-24

## 2024-08-27 NOTE — PROGRESS NOTES
[facility]  Obstetrics and Gynecology  Gynecology Visit    Chief Complaint   Patient presents with    Annual           Suma Montserrat Cox is a 43 year old female who presents for annual exam and discuss menstrual changes.    LMP: 2024.    Menses regular: 15-28 days .    Menstrual flow normal: normal .    Birth control or HRT:  no.   Refill no  Last Pap Smear: 2023.  Any history of abnormal paps:  no  Last MM2024  Any Medication Refills needed today?: no  Sleep: 4 hours.    Diet: no.    Exercise: walking.   Screening labs/Blood work today: no.     Colonoscopy (if over 46 y/o): no.   Gardasil:(age 9-46 y/o) no.   Genetic Cancer screen (if indicated): no.   Flu (Aug-April): 10/3/2023.TDAP (every 10 years) 4/3/2018.      Additional Problems/concerns: annual exam and discuss menstrual changes.      Next Appt: 2025    Immunization History   Administered Date(s) Administered    Covid-19 Vaccine Pfizer 30 mcg/0.3 ml 2021, 2021, 10/08/2021    Covid-19 Vaccine Pfizer Anthony-Sucrose 30 mcg/0.3 ml 2022    FLULAVAL 6 months & older 0.5 ml Prefilled syringe (06675) 10/05/2019    FLUZONE 6 months and older PFS 0.5 ml (25785) 10/05/2019, 2021, 10/07/2022    Flucelvax 0.5 Ml Quad PFS Single Dose 10/07/2020    HEP B, Adult 2020, 2020, 2021    Hep A, Adult 2018, 2018    Influenza 10/07/2020, 10/03/2023    TDAP 2018         Current Outpatient Medications:     valsartan 160 MG Oral Tab, Take 1 tablet (160 mg total) by mouth daily., Disp: 90 tablet, Rfl: 3    Iron-Vitamin C  MG Oral Tab, , Disp: , Rfl:     Multiple Minerals-Vitamins (CITRACAL MAXIMUM PLUS) Oral Tab, Take 1 capsule by mouth daily.  , Disp: , Rfl:     Allergies   Allergen Reactions    Seasonal Coughing and Runny nose       OB History    Para Term  AB Living   0 0 0 0 0 0   SAB IAB Ectopic Multiple Live Births   0 0 0 0 0       Past Medical History:    Decorative tattoo     One small on lower back    Essential hypertension    Migraines    without aura       Past Surgical History:   Procedure Laterality Date    Remove intrauterine device      [place] teeth removed         Family History   Problem Relation Age of Onset    Hypertension Mother     Psoriasis Mother     Lipids Mother     Other (osteoporosis) Mother     Diabetes Mother         Diagnosed  (age 82)    Osteoporosis Mother     Cancer Father         Brain and lung cancer    Hypertension Brother     Lipids Brother     Other (Other) Brother         thyroid disorder        Tobacco  Allergies  Meds  Med Hx  Surg Hx  Fam Hx  Soc Hx        Social History     Socioeconomic History    Marital status:      Spouse name: Not on file    Number of children: Not on file    Years of education: Not on file    Highest education level: Not on file   Occupational History    Not on file   Tobacco Use    Smoking status: Former     Current packs/day: 0.00     Types: Cigarettes     Quit date: 2016     Years since quittin.6     Passive exposure: Never    Smokeless tobacco: Never   Vaping Use    Vaping status: Never Used   Substance and Sexual Activity    Alcohol use: No    Drug use: No    Sexual activity: Not on file   Other Topics Concern    Caffeine Concern Not Asked    Exercise Not Asked    Seat Belt Not Asked    Special Diet Not Asked    Stress Concern Not Asked    Weight Concern Not Asked   Social History Narrative    Not on file     Social Determinants of Health     Financial Resource Strain: Not on file   Food Insecurity: Not on file   Transportation Needs: Not on file   Physical Activity: Not on file   Stress: Not on file   Social Connections: Not on file   Housing Stability: At Risk (2023)    Received from [facility]     Living Situation: Not on file     Housing Problems: Not on file     BP (!) 156/93 (BP Location: Left arm, Patient Position: Sitting)   Ht 5' 5\" (1.651 m)   Wt 154 lb 3.4 oz (70  kg)   LMP 08/26/2024 (Exact Date)   BMI 25.66 kg/m²     Wt Readings from Last 3 Encounters:   08/27/24 154 lb 3.4 oz (70 kg)   08/02/24 157 lb (71.2 kg)   10/16/23 154 lb (69.9 kg)         Health Maintenance   Topic Date Due    Influenza Vaccine (1) 08/01/2021    Screen for Cervical Cancer 11/05/2021    DTaP,Tdap and Td Vaccines (3 - Td or Tdap) 03/18/2025    Hepatitis C Screening Completed    HIV Screening Completed    COVID-19 Vaccine Completed     Review of Systems   General: Present- Feeling well. Not Present- Chills, Fever, Weight Gain and Weight Loss.  HEENT: Not Present- Headache and Sore Throat.  Respiratory: Not Present- Cough, Difficulty Breathing, Hemoptysis and Sputum Production.  Cardiovascular: Not Present- Chest Pain, Elevated Blood Pressure, Fainting / Blacking Out and Shortness of Breath.  Gastrointestinal: Not Present- Constipation, Diarrhea, Nausea and Vomiting.  Female Genitourinary: Not Present- Discharge, Dysuria and Frequency.  Musculoskeletal: Not Present- Leg Cramps and Swelling of Extremities.  Neurological: Not Present- Dizziness and Headaches.  Psychiatric: Not Present- Anxiety and Depression.  Endocrine: Not Present- Appetite Changes, Hair Changes and Thyroid Problems.  Hematology: Not Present- Easy Bruising and Excessive bleeding.  All other systems negative     Physical Exam The physical exam findings are as follows:     General   Mental Status - Alert. General Appearance - Cooperative. Orientation - Oriented X4. Build & Nutrition - Well nourished.    Head and Neck  Thyroid   Gland Characteristics - normal size and consistency.    Chest and Lung Exam   Inspection:   Chest Wall: - Normal.  Percussion:   Quality and Intensity: - Percussion normal.  Palpation: - Palpation normal.  Auscultation:   Breath sounds: - Normal.  Adventitious sounds: - No Adventitious sounds.    Breast   Nipples: Characteristics - Bilateral - Normal. Discharge - Bilateral - None.  Breast - Bilateral -  Symmetric.    Cardiovascular   Auscultation: Rhythm - Regular. Heart Sounds - Normal heart sounds.  Murmurs & Other Heart Sounds: Auscultation of the heart reveals - No Murmurs.    Abdomen   Inspection: Inspection of the abdomen reveals - No Hernias. Incisional scars - no incisional scars.  Palpation/Percussion: Palpation and Percussion of the abdomen reveal - Non Tender and No Palpable abdominal masses.  Liver: - Normal.  Auscultation: Auscultation of the abdomen reveals - Bowel sounds normal.    Female Genitourinary     External Genitalia   Perineum - Normal. Bartholin's Gland - Bilateral - Normal. Clitoris - Normal.  Introitus: Characteristics - No Cystocele, Enterocele or Rectocele. Discharge - None.  Labia Majora: Lesions - Bilateral - None. Characteristics - Bilateral - Normal.  Labia Minora: Lesions - Bilateral - None. Characteristics - Bilateral - Normal.  Urethra: Characteristics - Normal. Discharge - None.  [place] Gland - Bilateral - Normal.  Vulva: Characteristics - Normal. Lesions - None.    Speculum & Bimanual   Vagina:   Vaginal Wall: - Normal.  Vaginal Lesions - None. Vaginal Mucosa - Normal.  Cervix: Characteristics - No Motion tenderness. Discharge - None.  Uterus: Characteristics - Normal. Position - Midposition.  Adnexa: Characteristics - Bilateral - Normal. Masses - No Adnexal Masses.      Rectal   Anorectal Exam: External - normal external exam.    Peripheral Vascular   Upper Extremity:   Palpation: - Pulses bilaterally normal.  Lower Extremity: Inspection - Bilateral - Inspection Normal.  Palpation: Edema - Bilateral - No edema.    Neurologic   Mental Status: - Normal.    Lymphatic  General Lymphatics   Description - Normal .       Discussed risks of BC  including risk of blood clots in legs, heart causing heart attack or brain causing a stroke; do not smoke while taking BC as increases risk of clots, nausea and mood disturbances;  (pt denies any family hx of blood clots); caution if any other  meds started while she is on BC especially if for contraception as can interfere with efficacy of BC (especially antibiotics) and can increase risk of pregnancy; may elevate BP and would have to stop if develops HTN; may elevate triglyceride levels/cholesterol; stop if causes severe headaches/migraines, visual changes or jaundice.  May cause nausea, vomiting, spotting or breakthru bleeding; leg/ankle swelling, dark pigment rash on face, breast tenderness, intolerance to contact lenses and gallstones. Also discussed not safe against pregnancy until second pill pack started and how to double up pill if misses a day and how to initiate first pack.     We discussed the risks and side effects associated with birth control including blood clots, nausea, mood disturbances. We talked about the importance of taking the BC at around the same time each day. We discussed what the patient should do if she misses pills. 30% of women will spot in the first month after starting and BC and 10% will in month 3. Pt to notify me if still spotting after 3 months. Patient is aware that if she has significant mood disturbances with this pill that she should notify me and we can change. Pt knows that the pill does not protect her from pregnancy in 100% and it does not protect her against STI. Pt informed that she will need to use other protection in the first month of starting the pill. She is a non-smoker, no FH of  clotting     Pt expressed understanding re: risks and benefits and would like to start med. No barriers to understanding identified.    RTO 3 months to review medication.        The New Menopause by Dr. Tri Chowdhury     Dietary changes, exercise and weight loss are cornerstones of management of Menopause      Check EPA list of 'Clean 15' and 'dirty dozen' while deciding to buy organic   Organic foods have less pesticides and chemical contaminants      Diet changes :   Best evidence is for whole food plant  based/mediterraean diet for health.  Small portion sized meals.  Metabolism changes in menopause require 200 Kcal/day less to maintain weight    Low carb - 30-40 gm with each meal.   High Fiber 25-30 gm each day (not included in supplements)    NO Sugar, Soda, Juices and Sweets.  --- Fill half of your plate with low carb veggies and greens as discussed.  -- eat low sugar fruits apple , pears and berries : blueberries, strawberries, raspberries etc    -- avoid tropical fruits like pineapple,mike , grapes , watermelon, papaya   -- about 25% fruits and 75% veggies  to reduce sugar intake    More freshly prepared meals than frozen or take-out  More plant based foods than processed meats or foods  (try to keep ingredients <5)   Incorporate good fats - Eg :Extra virgin olive oil, Nuts, Avocado.     Consider ''Time restricted eating / feeding '' also called ''intermittent fasting'': (Resource: \"The Complete Guide to Fasting\" by Dr Kristopher Valdez)  Eating all your small healthy meals in a narrow window 6-8 hour and only drinking water or non sugar or non carb liquids ( black tea, black coffee or green tea) rest of the time with a minimum overnight fasting periods of 12 hours       Consider starting the following supplements:  Vit B12  (chewable or drops version as it requires salivia for activation)    Vitamin D with K2 supplementation   Magnesium L threonate or glycinate   DHA & EPA   Probiotics         Exercise :   Brisk walk 30-45 min everyday or a total of 150 min per week - Goal 12,000 steps/daily  Lift weights 3-4 times a week - Heavy - 3 Sets of 7 Heavy reps  - whole body dumbbell or machines   Core exercises (bird dog, plank with mountain climbers, dead bug) 3 sets of 10 (three - four times a week )  Balance - yoga or balance exercises     Weight loss target :   10 % with 1/2-1 lb per week over 6 to 12 months to a BMI <25    Environment/Stress:  Sleep in room with temperature <67 degrees  Cotton/natural fiber sheets  and blankets  Fan   Meditation (try balance indy) start with 1 min/daily   Avoid alcohol as can trigger hot flashes and be a source of empty calories      If None of the above changes are working to address your symptoms - follow up with us in the office.    Follow up 3 months        1. Encounter for well woman exam with routine gynecological exam    2. Hot flashes    3. Sleep disturbances

## 2024-08-28 ENCOUNTER — PATIENT MESSAGE (OUTPATIENT)
Dept: INTERNAL MEDICINE CLINIC | Facility: CLINIC | Age: 44
End: 2024-08-28

## 2024-08-28 DIAGNOSIS — E61.1 IRON DEFICIENCY: Primary | ICD-10-CM

## 2024-08-28 NOTE — TELEPHONE ENCOUNTER
From: Suma Cox  To: Kim Hemphill  Sent: 8/28/2024 8:35 AM CDT  Subject: Follow up lab    Hello Dr. Hemphill,    I wanted to update you on my gynecological exam. I am on HRT - Twirla patch. I started yesterday and we will see how this one will go. You had asked me to do a follow up lab for my iron levels and liver enzymes in a month after our exam around early Sept. Would it be okay to push that follow up lab to early to mid October? I want to make sure that I have been on the patch for a while and do not want to skew the numbers because of it. I wanted to give you a heads up so you know that I am not ignoring the request.    Thank you,  Suma Cox

## 2024-10-22 ENCOUNTER — OFFICE VISIT (OUTPATIENT)
Dept: OBGYN CLINIC | Facility: CLINIC | Age: 44
End: 2024-10-22
Payer: COMMERCIAL

## 2024-10-22 VITALS
HEIGHT: 65 IN | SYSTOLIC BLOOD PRESSURE: 153 MMHG | DIASTOLIC BLOOD PRESSURE: 97 MMHG | WEIGHT: 150.13 LBS | BODY MASS INDEX: 25.01 KG/M2

## 2024-10-22 DIAGNOSIS — L70.0 ACNE VULGARIS: ICD-10-CM

## 2024-10-22 DIAGNOSIS — R23.2 HOT FLASHES: Primary | ICD-10-CM

## 2024-10-22 DIAGNOSIS — N92.0 MENORRHAGIA WITH REGULAR CYCLE: ICD-10-CM

## 2024-10-22 DIAGNOSIS — G47.9 SLEEP DISTURBANCES: ICD-10-CM

## 2024-10-22 PROCEDURE — 99214 OFFICE O/P EST MOD 30 MIN: CPT | Performed by: OBSTETRICS & GYNECOLOGY

## 2024-10-22 PROCEDURE — 3008F BODY MASS INDEX DOCD: CPT | Performed by: OBSTETRICS & GYNECOLOGY

## 2024-10-22 PROCEDURE — 3077F SYST BP >= 140 MM HG: CPT | Performed by: OBSTETRICS & GYNECOLOGY

## 2024-10-22 PROCEDURE — 3080F DIAST BP >= 90 MM HG: CPT | Performed by: OBSTETRICS & GYNECOLOGY

## 2024-10-22 RX ORDER — NORELGESTROMIN AND ETHINYL ESTRADIOL 150; 35 UG/D; UG/D
1 PATCH TRANSDERMAL WEEKLY
Qty: 9 PATCH | Refills: 5 | Status: SHIPPED | OUTPATIENT
Start: 2024-10-22

## 2024-10-22 RX ORDER — KETOROLAC TROMETHAMINE 10 MG/1
10 TABLET, FILM COATED ORAL EVERY 6 HOURS PRN
Qty: 20 TABLET | Refills: 1 | Status: SHIPPED | OUTPATIENT
Start: 2024-10-22

## 2024-10-22 RX ORDER — NORELGESTROMIN AND ETHINYL ESTRADIOL 150; 35 UG/D; UG/D
PATCH TRANSDERMAL
COMMUNITY
Start: 2024-10-17 | End: 2024-10-22

## 2024-10-22 NOTE — PROGRESS NOTES
Chief Complaint   Patient presents with    Follow - Up         Suma Cox is a 44 year old female who presents for follow up on Xulane, switched over from Twirla due to insurance.   LMP: 10/19/2024.    Menses regular: yes.    Menstrual flow normal: heavy.    Birth control or HRT: Xulane.   Refill n/a  Last Pap Smear: 2023 . Any history of abnormal paps: no   Gardasil:(age 9-44 y/o) no.   Any medication refills needed today?: no    Problems/concerns: Pt presents for follow up on Xulane, switched over from Twirla due to insurance    Next Appt: n/a        Immunization History   Administered Date(s) Administered    Covid-19 Vaccine Pfizer 30 mcg/0.3 ml 2021, 2021, 10/08/2021    Covid-19 Vaccine Pfizer Anthony-Sucrose 30 mcg/0.3 ml 2022    FLULAVAL 6 months & older 0.5 ml Prefilled syringe (08769) 10/05/2019    FLUZONE 6 months and older PFS 0.5 ml (23105) 10/05/2019, 2021, 10/07/2022    Flucelvax 0.5 Ml Quad PFS Single Dose 10/07/2020    HEP B, Adult 2020, 2020, 2021    Hep A, Adult 2018, 2018    Influenza 10/07/2020, 10/03/2023    TDAP 2018         Current Outpatient Medications:     XULANE 150-35 MCG/24HR Transdermal Patch Weekly, , Disp: , Rfl:     valsartan 160 MG Oral Tab, Take 1 tablet (160 mg total) by mouth daily., Disp: 90 tablet, Rfl: 3    Iron-Vitamin C  MG Oral Tab, , Disp: , Rfl:     Multiple Minerals-Vitamins (CITRACAL MAXIMUM PLUS) Oral Tab, Take 1 capsule by mouth daily.  , Disp: , Rfl:     Allergies[1]    OB History    Para Term  AB Living   0 0 0 0 0 0   SAB IAB Ectopic Multiple Live Births   0 0 0 0 0       Past Medical History:    Decorative tattoo    One small on lower back    Essential hypertension    Migraines    without aura       Past Surgical History:   Procedure Laterality Date    Remove intrauterine device      [place] teeth removed         Family History   Problem Relation Age of Onset    Hypertension  Mother     Psoriasis Mother     Lipids Mother     Other (osteoporosis) Mother     Diabetes Mother         Diagnosed  (age 82)    Osteoporosis Mother     Cancer Father         Brain and lung cancer    Hypertension Brother     Lipids Brother     Other (Other) Brother         thyroid disorder        Tobacco  Allergies  Meds  Med Hx  Surg Hx  Fam Hx  Soc Hx        Social History     Socioeconomic History    Marital status:      Spouse name: Not on file    Number of children: Not on file    Years of education: Not on file    Highest education level: Not on file   Occupational History    Not on file   Tobacco Use    Smoking status: Former     Current packs/day: 0.00     Types: Cigarettes     Quit date: 2016     Years since quittin.8     Passive exposure: Never    Smokeless tobacco: Never   Vaping Use    Vaping status: Never Used   Substance and Sexual Activity    Alcohol use: No    Drug use: No    Sexual activity: Not on file   Other Topics Concern    Caffeine Concern Not Asked    Exercise Not Asked    Seat Belt Not Asked    Special Diet Not Asked    Stress Concern Not Asked    Weight Concern Not Asked   Social History Narrative    Not on file     Social Drivers of Health     Financial Resource Strain: Not on file   Food Insecurity: Not on file   Transportation Needs: Not on file   Physical Activity: Not on file   Stress: Not on file   Social Connections: Not on file   Housing Stability: At Risk (2023)    Received from [facility]     Living Situation: Not on file     Housing Problems: Not on file       BP (!) 153/97 (BP Location: Left arm, Patient Position: Sitting)   Ht 5' 5\" (1.651 m)   Wt 150 lb 2.1 oz (68.1 kg)   LMP 2024 (Exact Date)   Wt Readings from Last 3 Encounters:   10/22/24 150 lb 2.1 oz (68.1 kg)   24 154 lb 3.4 oz (70 kg)   24 157 lb (71.2 kg)     Health Maintenance   Topic Date Due    Colorectal Cancer Screening  Never done    Influenza  Vaccine (1) 10/01/2024    HTN: BP Follow-Up  11/22/2024    Mammogram  02/16/2025    Annual Physical  08/02/2025    Pap Smear  08/24/2026    DTaP,Tdap,and Td Vaccines (2 - Td or Tdap) 04/03/2028    Annual Depression Screening  Completed    COVID-19 Vaccine  Completed    Pneumococcal Vaccine: Birth to 64yrs  Aged Out         Review of Systems   General: Present- Feeling well.  Cardiovascular: Not Present- Chest Pain, Elevated Blood Pressure, Leg Pain and/or Swelling and Shortness of Breath.  Gastrointestinal: Not Present- Nausea and Vomiting.  Female Genitourinary: Not Present- Discharge, Dysmenorrhea, Dysuria, Excessive Menstrual Bleeding and Pelvic Pain.  Musculoskeletal: Not Present- Leg Cramps and Swelling of Extremities.  Neurological: Not Present- Headaches.  Psychiatric: Not Present- Anxiety and Depression.  All other systems negative       Physical Exam   The physical exam findings are as follows:       General   Mental Status - Alert. General Appearance - Well Developed/Well Nourished/No acute distress/ NC/AT.      Note: More than 50% of this visit was spent in counseling or coordinating care for the following reason hormone patch and perimenopausal symptoms .      [place]-Menopause/MENOPAUSE TIPS    THE NEW MENOPAUSE by: Dr. Tri Chowdhury    NEXT LEVEL: Your Guide to kicking Ass, Feeling Great, and Crushing Goals Through Menopause and Beyond by: Shirley Chiu PhD    Dietary changes, exercise and weight loss are cornerstones of management of Menopause     Check EPA list of 'Clean 15' and 'dirty dozen' while deciding to buy organic  Organic foods have less pesticides and chemical contaminants     Diet changes :  Best evidence is for whole food plant based/Mediterranean diet for health.  Small portion sized meals.  Metabolism changes in menopause require 200 Kcal/day less to maintain weight    High Protein at least 100g per day  Low carb - 30-40 gm with each meal.  High Fiber 25-30 gm each day (not included in  supplements)    NO Sugar, Soda, Juices and Sweets.  --- Fill half of your plate with low carb veggies and greens as discussed.  -- eat low sugar fruits apple, pears and berries: blueberries, strawberries, raspberries etc.    -- avoid tropical fruits like pineapple, mike, grapes, watermelon, papaya  -- about 25% fruits and 75% veggies  to reduce sugar intake    More freshly prepared meals than frozen or take-out  More plant based foods than processed meats or foods  (try to keep ingredients <5)  Incorporate good fats - Eg. :Extra virgin olive oil, Nuts, Avocado.     Consider starting the following supplements:  Vit B12  (chewable or drops version as it requires salivia for activation)    Vitamin D with K2 supplementation  Magnesium L threonate or glycinate  DHA & EPA  Probiotics      Exercise :  Brisk walk 30-45 min every day or a total of 150 min per week - Goal 12,000 steps/daily  Lift weights 3-4 times a week - Heavy - 3 Sets of 7 Heavy reps  - whole body dumbbell or machines  Core exercises (bird dog, plank with mountain climbers, dead bug) 3 sets of 10 (three - four times a week )  Balance - yoga or balance exercises  10 minutes of jumping per week    Environment/Stress:  Sleep in room with temperature <67 degrees  Cotton/natural fiber sheets and blankets  Fan  Meditation (try balance indy) start with 1 min/daily  Avoid alcohol as can trigger hot flashes and be a source of empty calories     If None of the above changes are working to address your symptoms - follow up with us in the office.    Hot flashes, acne, sleep definitely improved with the patch.  Having dysmenorrhea discussed using patch continuously and having a menses every 3 months.  Will give her prescription for Toradol and encouraged to try 600 mg of ibuprofen every 6 hours for the first 3 days of her menses.    1. Hot flashes    2. Sleep disturbances    3. Menorrhagia with regular cycle    4. Acne vulgaris             [1]   Allergies  Allergen  Reactions    Seasonal Coughing and Runny nose

## 2024-10-22 NOTE — PROGRESS NOTES
[facility]  Obstetrics and Gynecology  Gynecology Visit    Chief Complaint   Patient presents with    Annual           Suma Mariano Brooke is a 44 year old female who presents for annual exam and follow up on [place].    LMP: ***.    Menses regular: ***.    Menstrual flow normal: ***.    Birth control or HRT: .   Refill ***  Last Pap Smear: ***.  Any history of abnormal paps: ***   Last MMG: ***  Any Medication Refills needed today?: ***  Sleep: ***.    Diet: ***.    Exercise: ***.   Screening labs/Blood work today: ***.     Colonoscopy (if over 44 y/o): ***.   Gardasil:(age 9-44 y/o) ***.   Genetic Cancer screen (if indicated): ***.   Flu (Aug-April): ***.TDAP (every 10 years) ***.      Additional Problems/concerns: ***.      Next Appt: ***    Immunization History   Administered Date(s) Administered    Covid-19 Vaccine Pfizer 30 mcg/0.3 ml 2021, 2021, 10/08/2021    Covid-19 Vaccine Pfizer Anthony-Sucrose 30 mcg/0.3 ml 2022    FLULAVAL 6 months & older 0.5 ml Prefilled syringe (84805) 10/05/2019    FLUZONE 6 months and older PFS 0.5 ml (70797) 10/05/2019, 2021, 10/07/2022    Flucelvax 0.5 Ml Quad PFS Single Dose 10/07/2020    HEP B, Adult 2020, 2020, 2021    Hep A, Adult 2018, 2018    Influenza 10/07/2020, 10/03/2023    TDAP 2018         Current Outpatient Medications:     valsartan 160 MG Oral Tab, Take 1 tablet (160 mg total) by mouth daily., Disp: 90 tablet, Rfl: 3    Iron-Vitamin C  MG Oral Tab, , Disp: , Rfl:     Multiple Minerals-Vitamins (CITRACAL MAXIMUM PLUS) Oral Tab, Take 1 capsule by mouth daily.  , Disp: , Rfl:     Allergies[1]    OB History    Para Term  AB Living   0 0 0 0 0 0   SAB IAB Ectopic Multiple Live Births   0 0 0 0 0       Past Medical History:    Decorative tattoo    One small on lower back    Essential hypertension    Migraines    without aura       Past Surgical History:   Procedure Laterality Date    Remove  intrauterine device      Meta teeth removed         Family History   Problem Relation Age of Onset    Hypertension Mother     Psoriasis Mother     Lipids Mother     Other (osteoporosis) Mother     Diabetes Mother         Diagnosed  (age 82)    Osteoporosis Mother     Cancer Father         Brain and lung cancer    Hypertension Brother     Lipids Brother     Other (Other) Brother         thyroid disorder        Allergies         Social History     Socioeconomic History    Marital status:      Spouse name: Not on file    Number of children: Not on file    Years of education: Not on file    Highest education level: Not on file   Occupational History    Not on file   Tobacco Use    Smoking status: Former     Current packs/day: 0.00     Types: Cigarettes     Quit date: 2016     Years since quittin.8     Passive exposure: Never    Smokeless tobacco: Never   Vaping Use    Vaping status: Never Used   Substance and Sexual Activity    Alcohol use: No    Drug use: No    Sexual activity: Not on file   Other Topics Concern    Caffeine Concern Not Asked    Exercise Not Asked    Seat Belt Not Asked    Special Diet Not Asked    Stress Concern Not Asked    Weight Concern Not Asked   Social History Narrative    Not on file     Social Drivers of Health     Financial Resource Strain: Not on file   Food Insecurity: Not on file   Transportation Needs: Not on file   Physical Activity: Not on file   Stress: Not on file   Social Connections: Not on file   Housing Stability: At Risk (2023)    Received from [email or web address]    [facility]     Living Situation: Not on file     Housing Problems: Not on file                      [1]   Allergies  Allergen Reactions    Seasonal Coughing and Runny nose

## 2024-11-07 ENCOUNTER — PATIENT MESSAGE (OUTPATIENT)
Dept: INTERNAL MEDICINE CLINIC | Facility: CLINIC | Age: 44
End: 2024-11-07

## 2024-11-07 ENCOUNTER — LAB ENCOUNTER (OUTPATIENT)
Dept: LAB | Age: 44
End: 2024-11-07
Attending: INTERNAL MEDICINE
Payer: COMMERCIAL

## 2024-11-07 DIAGNOSIS — E61.1 IRON DEFICIENCY: ICD-10-CM

## 2024-11-07 DIAGNOSIS — R74.8 ELEVATED LIVER ENZYMES: ICD-10-CM

## 2024-11-07 LAB
ALBUMIN SERPL-MCNC: 4.9 G/DL (ref 3.2–4.8)
ALP LIVER SERPL-CCNC: 77 U/L
ALT SERPL-CCNC: 23 U/L
AST SERPL-CCNC: 19 U/L (ref ?–34)
BILIRUB DIRECT SERPL-MCNC: <0.1 MG/DL (ref ?–0.3)
BILIRUB SERPL-MCNC: 0.3 MG/DL (ref 0.3–1.2)
DEPRECATED HBV CORE AB SER IA-ACNC: 8 NG/ML
IRON SATN MFR SERPL: 3 %
IRON SERPL-MCNC: 16 UG/DL
PROT SERPL-MCNC: 7.6 G/DL (ref 5.7–8.2)
TOTAL IRON BINDING CAPACITY: 507 UG/DL (ref 250–425)
TRANSFERRIN SERPL-MCNC: 413 MG/DL (ref 250–380)

## 2024-11-07 PROCEDURE — 82728 ASSAY OF FERRITIN: CPT | Performed by: INTERNAL MEDICINE

## 2024-11-07 PROCEDURE — 80076 HEPATIC FUNCTION PANEL: CPT | Performed by: INTERNAL MEDICINE

## 2024-11-07 PROCEDURE — 83550 IRON BINDING TEST: CPT | Performed by: INTERNAL MEDICINE

## 2024-11-07 PROCEDURE — 83540 ASSAY OF IRON: CPT | Performed by: INTERNAL MEDICINE

## 2024-11-19 ENCOUNTER — TELEPHONE (OUTPATIENT)
Dept: OBGYN CLINIC | Facility: CLINIC | Age: 44
End: 2024-11-19

## 2025-01-21 ENCOUNTER — OFFICE VISIT (OUTPATIENT)
Dept: OBGYN CLINIC | Facility: CLINIC | Age: 45
End: 2025-01-21
Payer: COMMERCIAL

## 2025-01-21 ENCOUNTER — TELEPHONE (OUTPATIENT)
Dept: OBGYN CLINIC | Facility: CLINIC | Age: 45
End: 2025-01-21

## 2025-01-21 ENCOUNTER — LAB ENCOUNTER (OUTPATIENT)
Dept: LAB | Age: 45
End: 2025-01-21
Attending: OBSTETRICS & GYNECOLOGY
Payer: COMMERCIAL

## 2025-01-21 VITALS
BODY MASS INDEX: 25.61 KG/M2 | DIASTOLIC BLOOD PRESSURE: 108 MMHG | HEIGHT: 65 IN | SYSTOLIC BLOOD PRESSURE: 160 MMHG | WEIGHT: 153.69 LBS

## 2025-01-21 DIAGNOSIS — N92.6 IRREGULAR UTERINE BLEEDING: ICD-10-CM

## 2025-01-21 DIAGNOSIS — N92.0 MENORRHAGIA WITH REGULAR CYCLE: Primary | ICD-10-CM

## 2025-01-21 DIAGNOSIS — R03.0 TRANSIENT ELEVATED BLOOD PRESSURE: ICD-10-CM

## 2025-01-21 DIAGNOSIS — N92.6 IRREGULAR UTERINE BLEEDING: Primary | ICD-10-CM

## 2025-01-21 LAB
CRP SERPL-MCNC: 0.8 MG/DL (ref ?–0.5)
ERYTHROCYTE [SEDIMENTATION RATE] IN BLOOD: 33 MM/HR

## 2025-01-21 PROCEDURE — 3077F SYST BP >= 140 MM HG: CPT | Performed by: OBSTETRICS & GYNECOLOGY

## 2025-01-21 PROCEDURE — 85652 RBC SED RATE AUTOMATED: CPT

## 2025-01-21 PROCEDURE — 86140 C-REACTIVE PROTEIN: CPT

## 2025-01-21 PROCEDURE — 36415 COLL VENOUS BLD VENIPUNCTURE: CPT

## 2025-01-21 PROCEDURE — 3080F DIAST BP >= 90 MM HG: CPT | Performed by: OBSTETRICS & GYNECOLOGY

## 2025-01-21 PROCEDURE — 99214 OFFICE O/P EST MOD 30 MIN: CPT | Performed by: OBSTETRICS & GYNECOLOGY

## 2025-01-21 PROCEDURE — 3008F BODY MASS INDEX DOCD: CPT | Performed by: OBSTETRICS & GYNECOLOGY

## 2025-01-21 RX ORDER — FERRIC MALTOL 30 MG/1
1 CAPSULE ORAL 2 TIMES DAILY
Qty: 60 CAPSULE | Refills: 3 | Status: SHIPPED | OUTPATIENT
Start: 2025-01-21 | End: 2025-01-23

## 2025-01-21 RX ORDER — ESTRADIOL 0.1 MG/G
CREAM VAGINAL
Qty: 42.5 G | Refills: 3 | Status: SHIPPED | OUTPATIENT
Start: 2025-01-21 | End: 2025-01-23

## 2025-01-21 NOTE — PROGRESS NOTES
Chief Complaint   Patient presents with    Follow - Up       Suma Cox is a 44 year old female who presents for using patch continuously and is experiencing heavy break through bleeding but improve skin and moood.   LMP: 08/26/24 .    Menses regular: normal .    Menstrual flow normal: moderate .    Birth control or HRT: Xulane patch .   Refill none   Last Pap Smear: 08/24/23  . Any history of abnormal paps: normal    Gardasil:(age 9-46 y/o) n/a .   Any medication refills needed today?: none     Problems/concerns: no other concerns, .      Next Appt: 09/02/25         Immunization History   Administered Date(s) Administered    Covid-19 Vaccine Pfizer 30 mcg/0.3 ml 03/12/2021, 04/02/2021, 10/08/2021    Covid-19 Vaccine Pfizer Anthony-Sucrose 30 mcg/0.3 ml 06/11/2022    FLULAVAL 6 months & older 0.5 ml Prefilled syringe (76393) 10/05/2019    FLUZONE 6 months and older PFS 0.5 ml (03047) 10/05/2019, 09/16/2021, 10/07/2022, 10/03/2023    Flucelvax 0.5 Ml Quad PFS Single Dose 10/07/2020    Flucelvax Influenza vaccine, trivalent (ccIIV3), 0.5mL IM 10/07/2020, 09/27/2024    HEP B, Adult 08/03/2020, 09/08/2020, 02/03/2021    Hep A, Adult 04/03/2018, 11/01/2018    Influenza 10/07/2020, 10/03/2023    Pfizer Covid-19 Vaccine 30mcg/0.3ml 12yrs+ 09/18/2023    TDAP 04/03/2018         Current Outpatient Medications:     estradiol (ESTRACE) 0.1 MG/GM Vaginal Cream, Place 1 g vaginally every evening for 14 days, THEN 1 g twice a week., Disp: 42.5 g, Rfl: 3    Ferric Maltol (ACCRUFER) 30 MG Oral Cap, Take 1 tablet by mouth in the morning and 1 tablet before bedtime., Disp: 60 capsule, Rfl: 3    XULANE 150-35 MCG/24HR Transdermal Patch Weekly, Place 1 patch onto the skin once a week. Patient uses patch continuously, Disp: 9 patch, Rfl: 5    Ketorolac Tromethamine 10 MG Oral Tab, Take 1 tablet (10 mg total) by mouth every 6 (six) hours as needed for Pain., Disp: 20 tablet, Rfl: 1    valsartan 160 MG Oral Tab, Take 1 tablet (160 mg  total) by mouth daily., Disp: 90 tablet, Rfl: 3    Iron-Vitamin C  MG Oral Tab, , Disp: , Rfl:     Allergies[1]    OB History    Para Term  AB Living   0 0 0 0 0 0   SAB IAB Ectopic Multiple Live Births   0 0 0 0 0       Gyn History      No data recorded      Latest Ref Rng & Units 2023     6:56 PM   RECENT PAP RESULTS   INTERPRETATION/RESULT:  Cytology Results: Negative for intraepithelial lesion or malignancy.    HPV Negative Negative            Past Medical History:    Abnormal uterine bleeding    After start of hrt patch    Anemia    Due to heavy periods    Decorative tattoo    One small on lower back    Dysmenorrhea    Essential hypertension    Migraines    without aura       Past Surgical History:   Procedure Laterality Date    Remove intrauterine device      [place] teeth removed         Family History   Problem Relation Age of Onset    Hypertension Mother     Psoriasis Mother     Lipids Mother     Other (osteoporosis) Mother     Diabetes Mother         Diagnosed  (age 82)    Osteoporosis Mother     Cancer Father         Brain and lung cancer    Hypertension Brother     Lipids Brother     Other (Other) Brother         thyroid disorder        Tobacco  Soc Hx        Social History     Socioeconomic History    Marital status:      Spouse name: Not on file    Number of children: Not on file    Years of education: Not on file    Highest education level: Not on file   Occupational History    Not on file   Tobacco Use    Smoking status: Former     Current packs/day: 0.00     Types: Cigarettes     Quit date: 2016     Years since quittin.0     Passive exposure: Never    Smokeless tobacco: Never   Vaping Use    Vaping status: Never Used   Substance and Sexual Activity    Alcohol use: No    Drug use: No    Sexual activity: Yes     Partners: Male     Birth control/protection: Contraceptive patch   Other Topics Concern    Caffeine Concern Not Asked    Exercise Not Asked     Seat Belt Not Asked    Special Diet Not Asked    Stress Concern Not Asked    Weight Concern Not Asked   Social History Narrative    Not on file     Social Drivers of Health     Financial Resource Strain: Not on file   Food Insecurity: Not on file   Transportation Needs: Not on file   Physical Activity: Not on file   Stress: Not on file   Social Connections: Not on file   Housing Stability: At Risk (8/27/2023)    Received from [facility]     Living Situation: Not on file     Housing Problems: Not on file        BP (!) 160/108   Ht 5' 5\" (1.651 m)   Wt 153 lb 10.6 oz   LMP 08/26/2024 (Exact Date)   Wt Readings from Last 3 Encounters:   01/21/25 153 lb 10.6 oz (69.7 kg)   10/22/24 150 lb 2.1 oz (68.1 kg)   08/27/24 154 lb 3.4 oz (70 kg)     Health Maintenance   Topic Date Due    Colorectal Cancer Screening  Never done    COVID-19 Vaccine (6 - 2024-25 season) 09/01/2024    Annual Depression Screening  01/01/2025    Mammogram  02/16/2025    HTN: BP Follow-Up  02/21/2025    Annual Physical  08/02/2025    Pap Smear  08/24/2026    DTaP,Tdap,and Td Vaccines (2 - Td or Tdap) 04/03/2028    Influenza Vaccine  Completed    Pneumococcal Vaccine: Birth to 50yrs  Aged Out    Meningococcal B Vaccine  Aged Out         Review of Systems   General: Present- Feeling well.  Cardiovascular: Not Present- Chest Pain, Elevated Blood Pressure, Leg Pain and/or Swelling and Shortness of Breath.  Gastrointestinal: Not Present- Nausea and Vomiting.  Female Genitourinary: Not Present- Discharge, Dysmenorrhea, Dysuria, Excessive Menstrual Bleeding and Pelvic Pain.  Musculoskeletal: Not Present- Leg Cramps and Swelling of Extremities.  Neurological: Not Present- Headaches.  Psychiatric: Not Present- Anxiety and Depression.  All other systems negative       Physical Exam   The physical exam findings are as follows:       General   Mental Status - Alert. General Appearance - Well Developed/Well Nourished/No acute distress/  NC/AT.    Pt reports that patch has been helping with hot flashes, acne, and sleep. Having menorrhagia and labs showed low iron. Discussed increasing dose of estrogen via estradiol cream. Discussed ferric maltol.     Pt verbalized understanding and had no further questions at this time.     Discussed options plan for hysteroscopy with endometrial ablation following the ultrasound     Note: More than 50% of this visit was spent in counseling or coordinating care for the following reason menorrhagia and low iron .  The total amount of time spent was 30 minutes.    1. Irregular uterine bleeding    2. Transient elevated blood pressure              [1]   Allergies  Allergen Reactions    Seasonal Coughing and Runny nose

## 2025-01-21 NOTE — TELEPHONE ENCOUNTER
Ferric Maltol (ACCRUFER) 30 MG Oral Cap, Take 1 tablet by mouth in the morning and 1 tablet before bedtime., Disp: 60 capsule, Rfl: 3  Key: [id number]

## 2025-01-22 NOTE — TELEPHONE ENCOUNTER
SURGERY:  schedule hysteroscopy D&C with ablation     DATE REQUESTED: or day     Hosp Stay: Out Pt     Major/Minor: Minor     Anticipated time: 20 min     Anesthesia:  MAC     ASSIST NEEDED:  no     PRE-OP WITH PCP: no     DX:  Menorrhagia       Addie Jimenez MD

## 2025-01-23 ENCOUNTER — HOSPITAL ENCOUNTER (OUTPATIENT)
Dept: ULTRASOUND IMAGING | Age: 45
Discharge: HOME | End: 2025-01-23
Attending: OBSTETRICS & GYNECOLOGY
Payer: COMMERCIAL

## 2025-01-23 ENCOUNTER — TELEPHONE (OUTPATIENT)
Dept: OBGYN CLINIC | Facility: CLINIC | Age: 45
End: 2025-01-23

## 2025-01-23 DIAGNOSIS — N92.6 IRREGULAR UTERINE BLEEDING: ICD-10-CM

## 2025-01-23 PROCEDURE — 76830 TRANSVAGINAL US NON-OB: CPT | Performed by: OBSTETRICS & GYNECOLOGY

## 2025-01-23 PROCEDURE — 76856 US EXAM PELVIC COMPLETE: CPT | Performed by: OBSTETRICS & GYNECOLOGY

## 2025-01-23 RX ORDER — ESTRADIOL 0.1 MG/G
CREAM VAGINAL
Qty: 42.5 G | Refills: 3 | Status: SHIPPED | OUTPATIENT
Start: 2025-01-23

## 2025-01-23 RX ORDER — FERRIC MALTOL 30 MG/1
1 CAPSULE ORAL 2 TIMES DAILY
Qty: 60 CAPSULE | Refills: 3 | Status: SHIPPED | OUTPATIENT
Start: 2025-01-23

## 2025-01-23 NOTE — TELEPHONE ENCOUNTER
ACCRUFER 30MG KEY: [id number]   The patient's goals for the shift include remain safe    The clinical goals for the shift include obtain testing      Problem: Chronic Conditions and Co-morbidities  Goal: Patient's chronic conditions and co-morbidity symptoms are monitored and maintained or improved  Flowsheets (Taken 5/27/2024 0500)  Care Plan - Patient's Chronic Conditions and Co-Morbidity Symptoms are Monitored and Maintained or Improved: Monitor and assess patient's chronic conditions and comorbid symptoms for stability, deterioration, or improvement

## 2025-01-24 NOTE — PROGRESS NOTES
Elevated inflammation markers - add SLE and RA blood work.   Values between 0.3 and 1 mg/dL may reflect minor degrees of inflammation, such as that seen in periodontitis, but may also reflect low-grade inflammatory states in conditions in which there are minor degrees of metabolic dysfunction, such as obesity and insulin resistance.  Order fasting insulin, make sure she has seen a dentist in last 6 months for check up and cleaning. Add Omega 3 fatty acids (fish oil) and avoid processed food. And we'll speak more about plan at next visit. Addie Jimenez MD

## 2025-01-28 NOTE — TELEPHONE ENCOUNTER
I spoke with the patient and she is waiting on her ultrasound results before she decides if she wants to move forward with the procedure

## 2025-02-04 ENCOUNTER — TELEPHONE (OUTPATIENT)
Dept: OBGYN CLINIC | Facility: CLINIC | Age: 45
End: 2025-02-04

## 2025-02-04 NOTE — TELEPHONE ENCOUNTER
I spoke with the patient, confirmed date and time for her procedure. I also sent a minor surgical case letter via [employer]     Surgical case request has been sent     Via availity prior authorization is not needed, reference number is [id number]

## 2025-02-19 ENCOUNTER — HOSPITAL ENCOUNTER (OUTPATIENT)
Dept: MAMMOGRAPHY | Age: 45
Discharge: HOME | End: 2025-02-19
Attending: INTERNAL MEDICINE
Payer: COMMERCIAL

## 2025-02-19 DIAGNOSIS — Z13.9 SCREENING DUE: ICD-10-CM

## 2025-02-19 DIAGNOSIS — Z12.31 ENCOUNTER FOR SCREENING MAMMOGRAM FOR MALIGNANT NEOPLASM OF BREAST: ICD-10-CM

## 2025-02-19 PROCEDURE — 77067 SCR MAMMO BI INCL CAD: CPT | Performed by: OBSTETRICS & GYNECOLOGY

## 2025-02-19 PROCEDURE — 77063 BREAST TOMOSYNTHESIS BI: CPT | Performed by: OBSTETRICS & GYNECOLOGY

## 2025-02-24 DIAGNOSIS — R92.343 EXTREMELY DENSE TISSUE OF BOTH BREASTS ON MAMMOGRAPHY: Primary | ICD-10-CM

## 2025-02-28 PROBLEM — N92.6 IRREGULAR UTERINE BLEEDING: Status: ACTIVE | Noted: 2025-02-28

## 2025-02-28 NOTE — DISCHARGE INSTRUCTIONS
Over the counter Motrin 600mg every 6 hours as needed for pain alternating with extra strength tylenol every 6 hours  Call if bleeding soaking a pad in <1 hr, fever >100.6 degrees or pain not resolved with ice or over the counter Motrin.   Diet as tolerated  Ok to shower in 24 hours, no bathing until cleared by MD  Follow up appointment in 2-3 weeks.        HOME INSTRUCTIONS  [facility] HOME CARE INSTRUCTIONS: POST-OP ANESTHESIA  The medication that you received for sedation or general anesthesia can last up to 24 hours. Your judgment and reflexes may be altered, even if you feel like your normal self.      We Recommend:   Do not drive any motor vehicle or bicycle   Avoid mowing the lawn, playing sports, or working with power tools/applicances (power saws, electric knives or mixers)   That you have someone stay with you on your first night home   Do not drink alcohol or take sleeping pills or tranquilizers   Do not sign legal documents within 24 hours of your procedure   If you had a nerve block for your surgery, take extra care not to put any pressure on your arm or hand for 24 hours    It is normal:  For you to have a sore throat if you had a breathing tube during surgery (while you were asleep!). The sore throat should get better within 48 hours. You can gargle with warm salt water (1/2 tsp in 4 oz warm water) or use a throat lozenge for comfort  To feel muscle aches or soreness especially in the abdomen, chest or neck. The achy feeling should go away in the next 24 hours  To feel weak, sleepy or \"wiped out\". Your should start feeling better in the next 24 hours.   To experience mild discomforts such as sore lip or tongue, headache, cramps, gas pains or a bloated feeling in your abdomen.   To experience mild back pain or soreness for a day or two if you had spinal or epidural anesthesia.   If you had laparoscopic surgery, to feel shoulder pain or discomfort on the day of surgery.   For some patients to have nausea  after surgery/anesthesia    If you feel nausea or experience vomiting:   Try to move around less.   Eat less than usual or drink only liquids until the next morning   Nausea should resolve in about 24 hours    If you have a problem when you are at home:    Call your surgeons office   Discharge Instructions: After Your Surgery  You’ve just had surgery. During surgery, you were given medicine called anesthesia to keep you relaxed and free of pain. After surgery, you may have some pain or nausea. This is common. Here are some tips for feeling better and getting well after surgery.   Going home  Your healthcare provider will show you how to take care of yourself when you go home. They'll also answer your questions. Have an adult family member or friend drive you home. For the first 24 hours after your surgery:   Don't drive or use heavy equipment.  Don't make important decisions or sign legal papers.  Take medicines as directed.  Don't drink alcohol.  Have someone stay with you, if needed. They can watch for problems and help keep you safe.  Be sure to go to all follow-up visits with your healthcare provider. And rest after your surgery for as long as your provider tells you to.   Coping with pain  If you have pain after surgery, pain medicine will help you feel better. Take it as directed, before pain becomes severe. Also, ask your healthcare provider or pharmacist about other ways to control pain. This might be with heat, ice, or relaxation. And follow any other instructions your surgeon or nurse gives you.      Stay on schedule with your medicine.     Tips for taking pain medicine  To get the best relief possible, remember these points:   Pain medicines can upset your stomach. Taking them with a little food may help.  Most pain relievers taken by mouth need at least 20 to 30 minutes to start to work.  Don't wait till your pain becomes severe before you take your medicine. Try to time your medicine so that you can  take it before starting an activity. This might be before you get dressed, go for a walk, or sit down for dinner.  Constipation is a common side effect of some pain medicines. Call your healthcare provider before taking any medicines such as laxatives or stool softeners to help ease constipation. Also ask if you should skip any foods. Drinking lots of fluids and eating foods such as fruits and vegetables that are high in fiber can also help. Remember, don't take laxatives unless your surgeon has prescribed them.  Drinking alcohol and taking pain medicine can cause dizziness and slow your breathing. It can even be deadly. Don't drink alcohol while taking pain medicine.  Pain medicine can make you react more slowly to things. Don't drive or run machinery while taking pain medicine.  Your healthcare provider may tell you to take acetaminophen to help ease your pain. Ask them how much you're supposed to take each day. Acetaminophen or other pain relievers may interact with your prescription medicines or other over-the-counter (OTC) medicines. Some prescription medicines have acetaminophen and other ingredients in them. Using both prescription and OTC acetaminophen for pain can cause you to accidentally overdose. Read the labels on your OTC medicines with care. This will help you to clearly know the list of ingredients, how much to take, and any warnings. It may also help you not take too much acetaminophen. If you have questions or don't understand the information, ask your pharmacist or healthcare provider to explain it to you before you take the OTC medicine.   Managing nausea  Some people have an upset stomach (nausea) after surgery. This is often because of anesthesia, pain, or pain medicine, less movement of food in the stomach, or the stress of surgery. These tips will help you handle nausea and eat healthy foods as you get better. If you were on a special food plan before surgery, ask your healthcare provider if  you should follow it while you get better. Check with your provider on how your eating should progress. It may depend on the surgery you had. These general tips may help:   Don't push yourself to eat. Your body will tell you when to eat and how much.  Start off with clear liquids and soup. They're easier to digest.  Next try semi-solid foods as you feel ready. These include mashed potatoes, applesauce, and gelatin.  Slowly move to solid foods. Don’t eat fatty, rich, or spicy foods at first.  Don't force yourself to have 3 large meals a day. Instead eat smaller amounts more often.  Take pain medicines with a small amount of solid food, such as crackers or toast. This helps prevent nausea.  When to call your healthcare provider  Call your healthcare provider right away if you have any of these:   You still have too much pain, or the pain gets worse, after taking the medicine. The medicine may not be strong enough. Or there may be a complication from the surgery.  You feel too sleepy, dizzy, or groggy. The medicine may be too strong.  Side effects such as nausea or vomiting. Your healthcare provider may advise taking other medicines to .  Skin changes such as rash, itching, or hives. This may mean you have an allergic reaction. Your provider may advise taking other medicines.  The incision looks different (for instance, part of it opens up).  Bleeding or fluid leaking from the incision site, and weren't told to expect that.  Fever of 100.4°F (38°C) or higher, or as directed by your provider.  Call 911  Call 911 right away if you have:   Trouble breathing  Facial swelling    If you have obstructive sleep apnea   You were given anesthesia medicine during surgery to keep you comfortable and free of pain. After surgery, you may have more apnea spells because of this medicine and other medicines you were given. The spells may last longer than normal.    At home:  Keep using the continuous positive airway pressure (CPAP)  device when you sleep. Unless your healthcare provider tells you not to, use it when you sleep, day or night. CPAP is a common device used to treat obstructive sleep apnea.  Talk with your provider before taking any pain medicine, muscle relaxants, or sedatives. Your provider will tell you about the possible dangers of taking these medicines.  Contact your provider if your sleeping changes a lot even when taking medicines as directed.  [employer] last reviewed this educational content on 10/1/2021  © 3975-5170 [employer]. All rights reserved. This information is not intended as a substitute for professional medical care. Always follow your healthcare professional's instructions.

## 2025-02-28 NOTE — H&P
[facility]  part of [facility]        HISTORY AND PHYSICAL        Subjective   Chief Complaint:  Irregular uterine bleeding      History of Present Illness:    Suma Cox is a  44 year old y/o No obstetric history on file. who presents for scheduled gyn procedure  Hysteroscopy D&C  and endometrial ablation  3/03/2025.  The patients complaints include irregular uterine bleeding.          Past Medical History:    Abnormal uterine bleeding    After start of hrt patch    Anemia    Due to heavy periods    Decorative tattoo    One small on lower back    Dysmenorrhea    Essential hypertension    High blood pressure    Migraines    without aura       Past Surgical History:   Procedure Laterality Date    Remove intrauterine device      [place] teeth removed         OB History    Para Term  AB Living   0 0 0 0 0 0   SAB IAB Ectopic Multiple Live Births   0 0 0 0 0       Allergies[1]      Current Outpatient Medications:     B Complex Vitamins (VITAMIN B-COMPLEX OR), Take by mouth daily., Disp: , Rfl:     cholecalciferol 125 MCG (5000 UT) Oral Tab, Take 1 tablet (5,000 Units total) by mouth daily., Disp: , Rfl:     XULANE 150-35 MCG/24HR Transdermal Patch Weekly, Place 1 patch onto the skin once a week. Patient uses patch continuously, Disp: 9 patch, Rfl: 5    Ketorolac Tromethamine 10 MG Oral Tab, Take 1 tablet (10 mg total) by mouth every 6 (six) hours as needed for Pain., Disp: 20 tablet, Rfl: 1    valsartan 160 MG Oral Tab, Take 1 tablet (160 mg total) by mouth daily. (Patient taking differently: Take 1 tablet (160 mg total) by mouth every morning.), Disp: 90 tablet, Rfl: 3    Iron-Vitamin C  MG Oral Tab, daily., Disp: , Rfl:       Family History   Problem Relation Age of Onset    Hypertension Mother     Psoriasis Mother     Lipids Mother     Other (osteoporosis) Mother     Diabetes Mother         Diagnosed  (age 82)    Osteoporosis Mother     Cancer Father         Brain  and lung cancer    Hypertension Brother     Lipids Brother     Other (Other) Brother         thyroid disorder         REVIEW OF SYSTEMS:   CONSTITUTIONAL: Negative for fever, chills, diaphoresis, weakness, fatigue, weight loss, weight gain.  ALLERGIES: Negative for urticaria, hay fever, angioedema  EYES: Negative for blurry vision, decreased vision, loss of vision, eye pain, diplopia, photophobia, discharge  ENT: Negative for sore throat, nasal congestion, nasal discharge, epistaxis, tinnitus, hearing loss  CARDIOVASCULAR: Negative for chest pain, dyspnea on exertion, orthopnea, paroxysmal nocturnal dyspnea, edema, palpitations  RESPIRATORY: Negative for cough, hemoptysis, shortness of breath, pleuritic chest pain, wheezing  BREAST:  Denies breast mass, breast pain, nipple discharge or nipple pain.  ENDOCRINE: Negative for polydipsia/polyuria, palpitations, skin changes, temperature intolerance, unexpected weight changes  HEME-LYMPH: Negative for swollen lymph nodes, bleeding, bruising  GI: Negative abdominal pain, flank pain, nausea, vomiting, diarrhea, constipation, black stool, blood in stool  : Negative for dysuria, frequency/urgency, hematuria, genital discharge, vaginal bleeding, pelvic pain  NEURO: Negative for dizzy/vertigo, headache, focal weakness, numbness/tingling, speech problems, loss of consciousness, confusion, memory loss  MUSCULOSKELETAL: Negative for back pain, joint pain, joint stiffness, joint swelling, muscle pain, muscle weakness  SKIN: Negative for rash, itching, hives  PSYCH: Negative for anxiety, depression, physical abuse, sexual abuse      PHYSICAL EXAM:    Ht 5' 5\" (1.651 m)   Wt 152 lb   LMP 01/30/2025 (Exact Date)   BMI 25.29 kg/m²        General   Mental Status - Alert. General Appearance - Cooperative. Orientation - Oriented X4. Build & Nutrition - Well nourished.    Head and Neck  Thyroid   Gland Characteristics - normal size and consistency.    Chest and Lung Exam    Inspection:   Chest Wall: - Normal.  Percussion:   Quality and Intensity: - Percussion normal.  Palpation: - Palpation normal.  Auscultation:   Breath sounds: - Normal.  Adventitious sounds: - No Adventitious sounds.      Cardiovascular   Auscultation: Rhythm - Regular. Heart Sounds - Normal heart sounds.  Murmurs & Other Heart Sounds: Auscultation of the heart reveals - No Murmurs.      Abdomen   Inspection: Inspection of the abdomen reveals - No Hernias. Incisional scars - No incisional scars.  Palpation/Percussion: Palpation and Percussion of the abdomen reveal - Non Tender and No Palpable abdominal masses.  Liver: - Normal.  Auscultation: Auscultation of the abdomen reveals - Bowel sounds normal.      Female Genitourinary     External Genitalia   Perineum - Normal. Bartholin's Gland - Bilateral - Normal. Clitoris - Normal.  Introitus: Characteristics - No Cystocele, Enterocele or Rectocele. Discharge - None.  Labia Majora: Lesions - Bilateral - None. Characteristics - Bilateral - Normal.  Labia Minora: Lesions - Bilateral - None. Characteristics - Bilateral - Normal.  Urethra: Characteristics - Normal. Discharge - None.  Paoli Gland - Bilateral - Normal.  Vulva: Characteristics - Normal. Lesions - None.    Speculum & Bimanual   Vagina:   Vaginal Wall: - Normal.  Vaginal Lesions - None. Vaginal Mucosa - Normal.  Cervix: Characteristics - No Motion tenderness. Discharge - None.  Uterus: Characteristics - Normal. Position - Midposition.  Adnexa: Characteristics - Bilateral - Normal. Masses - No Adnexal Masses.      Peripheral Vascular   Upper Extremity:   Palpation: - Pulses bilaterally normal.  Lower Extremity: Inspection - Bilateral - Inspection Normal.  Palpation: Edema - Bilateral - No edema.      Neurologic   Mental Status: - Normal.      Lymphatic  General Lymphatics   Description - Normal .      Lab Results   Component Value Date    WBC 7.3 08/06/2024    HGB 14.7 08/06/2024    HCT 44.1 08/06/2024    PLT  210.0 08/06/2024    MCV 87.8 08/06/2024    RDW 14.6 08/06/2024     No components found for: \"ABOGROUP\", \"RHTYPE\", \"RUBIGG\"            Assessment and Plan:      Principal Problem:    Irregular uterine bleeding  Active Problems:    Essential hypertension    Migraine without aura and without status migrainosus, not intractable        The patient was counseled regarding surgery and the procedure (Hysteroscopy, dilation and curettage with Novasure endometrial ablation) was reviewed at length.   Risks of procedure including bleeding/need for blood transfusion (<1%), infection (5-10%), damage to other organs/bowel/bladder/ureters (<1%),  and anesthesia were reviewed.  Benefits, alternatives, & indications were also discussed.  All questions were answered.  Written information was provided.      Maryan MONAHAN      Patient seen and examined, Agree with assessment and plan of care documented by PA student.     Addie Jimenez MD           [1]   Allergies  Allergen Reactions    Seasonal Coughing and Runny nose

## 2025-03-03 ENCOUNTER — ANESTHESIA (OUTPATIENT)
Dept: SURGERY | Facility: HOSPITAL | Age: 45
End: 2025-03-03
Payer: COMMERCIAL

## 2025-03-03 ENCOUNTER — HOSPITAL ENCOUNTER (OUTPATIENT)
Facility: HOSPITAL | Age: 45
Setting detail: HOSPITAL OUTPATIENT SURGERY
Discharge: HOME | End: 2025-03-03
Attending: OBSTETRICS & GYNECOLOGY | Admitting: OBSTETRICS & GYNECOLOGY
Payer: COMMERCIAL

## 2025-03-03 ENCOUNTER — ANESTHESIA EVENT (OUTPATIENT)
Dept: SURGERY | Facility: HOSPITAL | Age: 45
End: 2025-03-03
Payer: COMMERCIAL

## 2025-03-03 VITALS
HEIGHT: 65 IN | OXYGEN SATURATION: 97 % | TEMPERATURE: 98 F | DIASTOLIC BLOOD PRESSURE: 94 MMHG | WEIGHT: 153 LBS | BODY MASS INDEX: 25.49 KG/M2 | RESPIRATION RATE: 16 BRPM | SYSTOLIC BLOOD PRESSURE: 156 MMHG | HEART RATE: 100 BPM

## 2025-03-03 DIAGNOSIS — N92.0 MENORRHAGIA WITH REGULAR CYCLE: ICD-10-CM

## 2025-03-03 LAB — B-HCG UR QL: NEGATIVE

## 2025-03-03 PROCEDURE — 58563 HYSTEROSCOPY ABLATION: CPT | Performed by: OBSTETRICS & GYNECOLOGY

## 2025-03-03 PROCEDURE — 0U5B8ZZ DESTRUCTION OF ENDOMETRIUM, VIA NATURAL OR ARTIFICIAL OPENING ENDOSCOPIC: ICD-10-PCS | Performed by: OBSTETRICS & GYNECOLOGY

## 2025-03-03 RX ORDER — MIDAZOLAM HYDROCHLORIDE 1 MG/ML
INJECTION INTRAMUSCULAR; INTRAVENOUS AS NEEDED
Status: DISCONTINUED | OUTPATIENT
Start: 2025-03-03 | End: 2025-03-03 | Stop reason: SURG

## 2025-03-03 RX ORDER — SODIUM CHLORIDE, SODIUM LACTATE, POTASSIUM CHLORIDE, CALCIUM CHLORIDE 600; 310; 30; 20 MG/100ML; MG/100ML; MG/100ML; MG/100ML
INJECTION, SOLUTION INTRAVENOUS CONTINUOUS
Status: DISCONTINUED | OUTPATIENT
Start: 2025-03-03 | End: 2025-03-03

## 2025-03-03 RX ORDER — HYDROMORPHONE HYDROCHLORIDE 1 MG/ML
0.6 INJECTION, SOLUTION INTRAMUSCULAR; INTRAVENOUS; SUBCUTANEOUS EVERY 5 MIN PRN
Status: DISCONTINUED | OUTPATIENT
Start: 2025-03-03 | End: 2025-03-03

## 2025-03-03 RX ORDER — MORPHINE SULFATE 10 MG/ML
6 INJECTION, SOLUTION INTRAMUSCULAR; INTRAVENOUS EVERY 10 MIN PRN
Status: DISCONTINUED | OUTPATIENT
Start: 2025-03-03 | End: 2025-03-03

## 2025-03-03 RX ORDER — METOPROLOL TARTRATE 1 MG/ML
2.5 INJECTION, SOLUTION INTRAVENOUS ONCE
Status: COMPLETED | OUTPATIENT
Start: 2025-03-03 | End: 2025-03-03

## 2025-03-03 RX ORDER — ONDANSETRON 2 MG/ML
4 INJECTION INTRAMUSCULAR; INTRAVENOUS EVERY 6 HOURS PRN
Status: DISCONTINUED | OUTPATIENT
Start: 2025-03-03 | End: 2025-03-03

## 2025-03-03 RX ORDER — NALOXONE HYDROCHLORIDE 0.4 MG/ML
80 INJECTION, SOLUTION INTRAMUSCULAR; INTRAVENOUS; SUBCUTANEOUS AS NEEDED
Status: DISCONTINUED | OUTPATIENT
Start: 2025-03-03 | End: 2025-03-03

## 2025-03-03 RX ORDER — LIDOCAINE HYDROCHLORIDE 10 MG/ML
INJECTION, SOLUTION EPIDURAL; INFILTRATION; INTRACAUDAL; PERINEURAL AS NEEDED
Status: DISCONTINUED | OUTPATIENT
Start: 2025-03-03 | End: 2025-03-03 | Stop reason: SURG

## 2025-03-03 RX ORDER — PROCHLORPERAZINE EDISYLATE 5 MG/ML
5 INJECTION INTRAMUSCULAR; INTRAVENOUS EVERY 8 HOURS PRN
Status: DISCONTINUED | OUTPATIENT
Start: 2025-03-03 | End: 2025-03-03

## 2025-03-03 RX ORDER — KETOROLAC TROMETHAMINE 30 MG/ML
INJECTION, SOLUTION INTRAMUSCULAR; INTRAVENOUS AS NEEDED
Status: DISCONTINUED | OUTPATIENT
Start: 2025-03-03 | End: 2025-03-03 | Stop reason: SURG

## 2025-03-03 RX ORDER — MORPHINE SULFATE 4 MG/ML
2 INJECTION, SOLUTION INTRAMUSCULAR; INTRAVENOUS EVERY 10 MIN PRN
Status: DISCONTINUED | OUTPATIENT
Start: 2025-03-03 | End: 2025-03-03

## 2025-03-03 RX ORDER — HYDROMORPHONE HYDROCHLORIDE 1 MG/ML
0.4 INJECTION, SOLUTION INTRAMUSCULAR; INTRAVENOUS; SUBCUTANEOUS EVERY 5 MIN PRN
Status: DISCONTINUED | OUTPATIENT
Start: 2025-03-03 | End: 2025-03-03

## 2025-03-03 RX ORDER — MORPHINE SULFATE 4 MG/ML
4 INJECTION, SOLUTION INTRAMUSCULAR; INTRAVENOUS EVERY 10 MIN PRN
Status: DISCONTINUED | OUTPATIENT
Start: 2025-03-03 | End: 2025-03-03

## 2025-03-03 RX ORDER — HYDRALAZINE HYDROCHLORIDE 20 MG/ML
5 INJECTION INTRAMUSCULAR; INTRAVENOUS ONCE
Status: COMPLETED | OUTPATIENT
Start: 2025-03-03 | End: 2025-03-03

## 2025-03-03 RX ORDER — ACETAMINOPHEN 500 MG
1000 TABLET ORAL ONCE
Status: COMPLETED | OUTPATIENT
Start: 2025-03-03 | End: 2025-03-03

## 2025-03-03 RX ORDER — LOSARTAN POTASSIUM 100 MG/1
100 TABLET ORAL ONCE
Status: COMPLETED | OUTPATIENT
Start: 2025-03-03 | End: 2025-03-03

## 2025-03-03 RX ORDER — HYDROMORPHONE HYDROCHLORIDE 1 MG/ML
0.2 INJECTION, SOLUTION INTRAMUSCULAR; INTRAVENOUS; SUBCUTANEOUS EVERY 5 MIN PRN
Status: DISCONTINUED | OUTPATIENT
Start: 2025-03-03 | End: 2025-03-03

## 2025-03-03 RX ADMIN — KETOROLAC TROMETHAMINE 30 MG: 30 INJECTION, SOLUTION INTRAMUSCULAR; INTRAVENOUS at 09:31:00

## 2025-03-03 RX ADMIN — MIDAZOLAM HYDROCHLORIDE 2 MG: 1 INJECTION INTRAMUSCULAR; INTRAVENOUS at 09:10:00

## 2025-03-03 RX ADMIN — LIDOCAINE HYDROCHLORIDE 20 MG: 10 INJECTION, SOLUTION EPIDURAL; INFILTRATION; INTRACAUDAL; PERINEURAL at 09:11:00

## 2025-03-03 NOTE — ANESTHESIA POSTPROCEDURE EVALUATION
Patient: Suma Cox    Procedure Summary       Date: 03/03/25 Room / Location: [facility] MAIN OR 02 / [facility] MAIN OR    Anesthesia Start: 0906 Anesthesia Stop:     Procedure: Hysteroscopy, dilation and curettage with Novasure endometrial ablation (Vagina ) Diagnosis:       Menorrhagia with regular cycle      (Menorrhagia with regular cycle [N92.0])    Surgeons: Addie Jimenez MD Anesthesiologist: Dary Goldberg MD    Anesthesia Type: MAC ASA Status: 3            Anesthesia Type: MAC    Vitals Value Taken Time   /78 03/03/25 0940   Temp 98 03/03/25 0940   Pulse 104 03/03/25 0939   Resp 17 03/03/25 0939   SpO2 99 % 03/03/25 0939   Vitals shown include unfiled device data.    [facility] AN Post Evaluation:   Patient Evaluated in PACU  Patient Participation: complete - patient participated  Level of Consciousness: awake  Pain Management: adequate  Airway Patency:patent  Dental exam unchanged from preop  Yes    Cardiovascular Status: acceptable  Respiratory Status: acceptable  Postoperative Hydration acceptable      DARY GOLDBERG MD  3/3/2025 9:40 AM

## 2025-03-03 NOTE — ANESTHESIA PREPROCEDURE EVALUATION
Anesthesia PreOp Note    HPI:     Suma Cox is a 44 year old female who presents for preoperative consultation requested by: Addie Jimenez MD    Date of Surgery: 3/3/2025    Procedure(s):  Hysteroscopy, dilation and curettage with Novasure endometrial ablation  Indication: Menorrhagia with regular cycle [N92.0]    Relevant Problems   No relevant active problems       NPO:  Last Liquid Consumption Date: 25  Last Liquid Consumption Time: 2100  Last Solid Consumption Date: 25  Last Solid Consumption Time: 190  Last Liquid Consumption Date: 25          History Review:  Patient Active Problem List    Diagnosis Date Noted    Irregular uterine bleeding 2025    Essential hypertension 2019    Migraine without aura and without status migrainosus, not intractable 2019       Past Medical History:    Abnormal uterine bleeding    After start of hrt patch    Anemia    Due to heavy periods    Decorative tattoo    One small on lower back    Dysmenorrhea    Essential hypertension    High blood pressure    Migraines    without aura       Past Surgical History:   Procedure Laterality Date    Remove intrauterine device      [place] teeth removed         Prescriptions Prior to Admission[1]  Current Medications and Prescriptions Ordered in Epic[2]    Allergies[3]    Family History   Problem Relation Age of Onset    Hypertension Mother     Psoriasis Mother     Lipids Mother     Other (osteoporosis) Mother     Diabetes Mother         Diagnosed  (age 82)    Osteoporosis Mother     Cancer Father         Brain and lung cancer    Hypertension Brother     Lipids Brother     Other (Other) Brother         thyroid disorder     Social History     Socioeconomic History    Marital status:    Tobacco Use    Smoking status: Former     Current packs/day: 0.00     Types: Cigarettes     Quit date: 2016     Years since quittin.1     Passive exposure: Never    Smokeless tobacco: Never    Vaping Use    Vaping status: Never Used   Substance and Sexual Activity    Alcohol use: No    Drug use: No    Sexual activity: Yes     Partners: Male     Birth control/protection: Contraceptive patch       Available pre-op labs reviewed.  Lab Results   Component Value Date    URINEPREG Negative 03/03/2025             Vital Signs:  Body mass index is 25.46 kg/m².   height is 1.651 m (5' 5\") and weight is 69.4 kg (153 lb). Her oral temperature is 97.8 °F (36.6 °C). Her blood pressure is 180/112 (abnormal) and her pulse is 113. Her respiration is 16 and oxygen saturation is 100%.   Vitals:    03/03/25 0727 03/03/25 0730 03/03/25 0745 03/03/25 0753   BP: (!) 191/105 (!) 192/110 (!) 189/105 (!) 180/112   Pulse: 113      Resp: 16      Temp: 97.8 °F (36.6 °C)      TempSrc: Oral      SpO2: 100%      Weight: 69.4 kg (153 lb)      Height:            Anesthesia Evaluation     Patient summary reviewed and Nursing notes reviewed    Airway   Mallampati: I  Dental      Pulmonary - negative ROS   Cardiovascular   Exercise tolerance: good    NYHA Classification: I    Neuro/Psych - negative ROS     GI/Hepatic/Renal - negative ROS     Endo/Other - negative ROS   Abdominal                  Anesthesia Plan:   ASA:  3  Plan:   MAC      I have informed Suma Cox and/or legal guardian or family member of the nature of the anesthetic plan, benefits, risks including possible dental damage if relevant, major complications, and any alternative forms of anesthetic management.   All of the patient's questions were answered to the best of my ability. The patient desires the anesthetic management as planned.  DARY GOLDBERG MD  3/3/2025 8:10 AM  Present on Admission:   Essential hypertension   Migraine without aura and without status migrainosus, not intractable           [1]   Medications Prior to Admission   Medication Sig Dispense Refill Last Dose/Taking    B Complex Vitamins (VITAMIN B-COMPLEX OR) Take by mouth daily.   3/2/2025     cholecalciferol 125 MCG (5000 UT) Oral Tab Take 1 tablet (5,000 Units total) by mouth daily.   3/2/2025    XULANE 150-35 MCG/24HR Transdermal Patch Weekly Place 1 patch onto the skin once a week. Patient uses patch continuously 9 patch 5 3/2/2025 Evening    valsartan 160 MG Oral Tab Take 1 tablet (160 mg total) by mouth daily. (Patient taking differently: Take 1 tablet (160 mg total) by mouth every morning.) 90 tablet 3 3/2/2025 Morning    Iron-Vitamin C  MG Oral Tab daily.   3/2/2025    Ketorolac Tromethamine 10 MG Oral Tab Take 1 tablet (10 mg total) by mouth every 6 (six) hours as needed for Pain. 20 tablet 1 More than a month   [2]   Current Facility-Administered Medications Ordered in Epic   Medication Dose Route Frequency Provider Last Rate Last Admin    lactated ringers infusion   Intravenous Continuous Addie Jimenez MD 20 mL/hr at 03/03/25 0750 New Bag at 03/03/25 0750    losartan (Cozaar) tab 100 mg  100 mg Oral Once Herminio Harley MD         No current [facility]-ordered outpatient medications on file.   [3]   Allergies  Allergen Reactions    Seasonal Coughing and Runny nose

## 2025-03-03 NOTE — INTERVAL H&P NOTE
Pre-op Diagnosis: Menorrhagia with regular cycle [N92.0]    The above referenced H&P was reviewed by Addie Jimenez MD on 3/3/2025, the patient was examined and no significant changes have occurred in the patient's condition since the H&P was performed.  I discussed with the patient and/or legal representative the potential benefits, risks and side effects of this procedure; the likelihood of the patient achieving goals; and potential problems that might occur during recuperation.  I discussed reasonable alternatives to the procedure, including risks, benefits and side effects related to the alternatives and risks related to not receiving this procedure.  We will proceed with procedure as planned.

## 2025-03-03 NOTE — OR NURSING
Mariaa    Length of cavity= 6.0cm  Width of cavity= 3.0cm  Power level= 99W  RF ablation time= 0:35

## 2025-03-03 NOTE — OPERATIVE REPORT
[facility] OPERATING ROOM  Operative Note     Suma Mariano [facility] Location: OR   [facility] [id number] MRN [id number]   Admission Date 3/3/2025 Operation Date 3/3/2025   Attending Physician Addie Jimenez MD Operating Physician Addie Jimenez MD      Preoperative Diagnosis: Menorrhagia with regular cycle [N92.0]     Postoperative Diagnosis: Menorrhagia with regular cycle [N92.0]     Procedure Performed:   Hysteroscopy, dilation and curettage with Novasure endometrial ablation          Primary Surgeon: Addie Jimenez MD      Assistant: none      Surgical Findings: one endometrial polyp      Anesthesia: MAC     Complications: none      Implants: * No implants in log *     Specimen: Endometrial curetting      Drains: none      Condition: stable     Estimated Blood Loss: Minimal      Summary of Case: The patient was taken to the operating room.  She was then prepped and draped in the normal sterile fashion. She was placed in the dorsal lithotomy position using the Omar stirrups, correct positioning was verified twice.  She was then prepped and draped in the normal sterile fashion. Time out procedure was done      A weighted vaginal speculum was placed the anterior lip of the cervix was grasped with a tenaculum.  The hysteroscope was placed into endocervical canal and under direct  visualization the uterine cavity was entered.  The uterine cavity was inspected and polyps were seen.  Hysteroscopy was removed and a D & C was done with daniel london - all specimens sent to lab.  Fluid deficit was 15 cc    The Novasure device was then use for the endometrial ablation.  The uterine cavity length was 5.0 cm and width was 3.0 cm.  After initial perforation test was passed, the ablation was done for a full cycle   The device was removed after procedure complete.    The tenaculum was remove, site was hemostatic.  Patient was taken out of dorsal lithotomy position, awaken and taken to [facility] in stable condition.  Post  procedure time out was done. All OR counts were correct.       Addie Jimenez MD  [facility] Outpatient Surgery

## 2025-03-20 ENCOUNTER — OFFICE VISIT (OUTPATIENT)
Dept: OBGYN CLINIC | Facility: CLINIC | Age: 45
End: 2025-03-20
Payer: COMMERCIAL

## 2025-03-20 ENCOUNTER — TELEPHONE (OUTPATIENT)
Dept: OBGYN CLINIC | Facility: CLINIC | Age: 45
End: 2025-03-20

## 2025-03-20 VITALS
SYSTOLIC BLOOD PRESSURE: 154 MMHG | HEIGHT: 65 IN | BODY MASS INDEX: 25.16 KG/M2 | DIASTOLIC BLOOD PRESSURE: 108 MMHG | WEIGHT: 151 LBS

## 2025-03-20 DIAGNOSIS — N92.0 MENORRHAGIA WITH REGULAR CYCLE: Primary | ICD-10-CM

## 2025-03-20 DIAGNOSIS — N84.0 ENDOMETRIAL POLYP: ICD-10-CM

## 2025-03-20 DIAGNOSIS — N95.1 PERIMENOPAUSE: ICD-10-CM

## 2025-03-20 PROCEDURE — 3077F SYST BP >= 140 MM HG: CPT | Performed by: OBSTETRICS & GYNECOLOGY

## 2025-03-20 PROCEDURE — 3080F DIAST BP >= 90 MM HG: CPT | Performed by: OBSTETRICS & GYNECOLOGY

## 2025-03-20 PROCEDURE — 99214 OFFICE O/P EST MOD 30 MIN: CPT | Performed by: OBSTETRICS & GYNECOLOGY

## 2025-03-20 PROCEDURE — 3008F BODY MASS INDEX DOCD: CPT | Performed by: OBSTETRICS & GYNECOLOGY

## 2025-03-20 RX ORDER — PROGESTERONE 200 MG/1
CAPSULE ORAL
Qty: 40 CAPSULE | Refills: 1 | Status: SHIPPED | OUTPATIENT
Start: 2025-03-20 | End: 2025-03-20

## 2025-03-20 RX ORDER — ESTRADIOL 0.07 MG/D
1 FILM, EXTENDED RELEASE TRANSDERMAL
Qty: 24 PATCH | Refills: 4 | Status: SHIPPED | OUTPATIENT
Start: 2025-03-20

## 2025-03-20 RX ORDER — PROGESTERONE 200 MG/1
CAPSULE ORAL
Qty: 40 CAPSULE | Refills: 1 | Status: SHIPPED | OUTPATIENT
Start: 2025-03-20

## 2025-03-20 NOTE — PROGRESS NOTES
Post-op follow up      Visit Type:  Post-operative follow-up    Date of Procedure:  03/03/2025    Procedure:  Hysteroscopy, dilation and curettage with Novasure endometrial ablation    Indications for Procedure:  Menorrhagia with regular cycle    Pathology Report:  Endometrium/endometrial polyp; polypectomy/curettage:  · Fragments of benign endometrial polyp and endometrial tissue demonstrating predominantly inactive characteristics and focal features compatible with shedding/breakdown.  · No evidence of endometrial hyperplasia, cytologic atypia, or malignancy identified.  · Fragments of endocervical and focal ectocervical tissue demonstrating areas of moderate chronic cervicitis, foci of mild acute cervicitis, foci of mild microglandular hyperplasia, and areas of squamous metaplastic change.  · No evidence of cervical dysplasia, polyps, or malignancy identified.    Surgery/Post-op Complications: none    Pain:  0    Medications pertaining to Surgery:  0    Incision (if applicable):  n/a    Diet: normal    Voiding:  normal    Bowel movements/Flatus:  normal    Activity: normal    Problems:  vaginal discharge    BP (!) 154/108   Ht 5' 5\" (1.651 m)   Wt 151 lb (68.5 kg)   LMP 01/30/2025 (Exact Date)     Wt Readings from Last 3 Encounters:   03/20/25 151 lb (68.5 kg)   03/03/25 153 lb (69.4 kg)   01/21/25 153 lb 10.6 oz (69.7 kg)       Health Maintenance   Topic Date Due    Colorectal Cancer Screening  Never done    COVID-19 Vaccine (6 - 2024-25 season) 09/01/2024    HTN: BP Follow-Up  04/20/2025    Annual Physical  08/02/2025    Mammogram  02/19/2026    Pap Smear  08/24/2026    DTaP,Tdap,and Td Vaccines (2 - Td or Tdap) 04/03/2028    Influenza Vaccine  Completed    Annual Depression Screening  Completed    Pneumococcal Vaccine: Birth to 50yrs  Aged Out    Meningococcal B Vaccine  Aged Out         Review of Systems   General: Present- Feeling well. Not Present- Fever.  Female Genitourinary: Not Present- Dysmenorrhea,  Dyspareunia, Flank Pain, Frequency, Menstrual Irregularities, Pelvic Pain, Urgency, Urinary Complaints, Vaginal Bleeding and Vaginal dryness.  Pain: Present- Pain Rating - 0 on a 0-10 scale.  All other systems negative       Physical Exam   The physical exam findings are as follows:     Abdomen   Inspection: - Inspection Normal.  Palpation/Percussion: Palpation and Percussion of the abdomen reveal - Non Tender, No hepatosplenomegaly and No Palpable abdominal masses.    Female Genitourinary     External Genitalia   Perineum - Normal. Bartholin's Gland - Bilateral - Normal. Clitoris - Normal.  Introitus: Characteristics - Normal. Discharge - None.  Labia Majora: Lesions - Bilateral - None. Characteristics - Bilateral - Normal.  Labia Minora: Lesions - Bilateral - None. Characteristics - Bilateral - Normal.  Urethra: Characteristics - Normal. Discharge - None.  [place] Gland - Bilateral - Normal.  Vulva: Characteristics - Normal. Lesions - None.    Speculum & Bimanual   Vagina:   Vaginal Wall: - Normal.  Vaginal Lesions - None. Vaginal Mucosa - Normal.  Cervix: Characteristics - No Motion tenderness. Discharge - None.  Uterus: Characteristics - Non Tender. Position - Midposition.  Adnexa: Characteristics - Bilateral - Tender. Masses - No Adnexal Masses.  Bladder - Normal.      Rectal   Anorectal Exam: External - normal external exam.    Lymphatic  General Lymphatics   Description - Normal .  BP (!) 154/108   Ht 5' 5\" (1.651 m)   Wt 151 lb (68.5 kg)   LMP 01/30/2025 (Exact Date)   Wt Readings from Last 3 Encounters:   03/20/25 151 lb (68.5 kg)   03/03/25 153 lb (69.4 kg)   01/21/25 153 lb 10.6 oz (69.7 kg)     Health Maintenance   Topic Date Due    Colorectal Cancer Screening  Never done    COVID-19 Vaccine (6 - 2024-25 season) 09/01/2024    HTN: BP Follow-Up  04/20/2025    Annual Physical  08/02/2025    Mammogram  02/19/2026    Pap Smear  08/24/2026    DTaP,Tdap,and Td Vaccines (2 - Td or Tdap) 04/03/2028    Influenza  Vaccine  Completed    Annual Depression Screening  Completed    Pneumococcal Vaccine: Birth to 50yrs  Aged Out    Meningococcal B Vaccine  Aged Out         Review of Systems   General: Present- Feeling off.  Cardiovascular: Not Present- Chest Pain, Elevated Blood Pressure, Leg Pain and/or Swelling and Shortness of Breath.  Gastrointestinal: Not Present- Nausea and Vomiting.  Female Genitourinary: Not Present- Discharge, Dysmenorrhea, Dysuria, Excessive Menstrual Bleeding and Pelvic Pain.  Musculoskeletal: Not Present- Leg Cramps and Swelling of Extremities.  Neurological: Not Present- Headaches.  Psychiatric: Not Present- Anxiety and Depression.  All other systems negative       Physical Exam   The physical exam findings are as follows:       General   Mental Status - Alert. General Appearance - Well Developed/Well Nourished/No acute distress/ NC/AT.      Note: More than 50% of this visit was spent in counseling or coordinating care for the following reason menopausal symptoms  .  The total amount of time spent was 30 minutes.    [facility]-Menopause/MENOPAUSE TIPS    THE NEW MENOPAUSE by: Dr. Tri Chowdhury    NEXT LEVEL: Your Guide to kicking Ass, Feeling Great, and Crushing Goals Through Menopause and Beyond by: Shirley Chiu PhD    Dietary changes, exercise and weight loss are cornerstones of management of Menopause     Check EPA list of 'Clean 15' and 'dirty dozen' while deciding to buy organic  Organic foods have less pesticides and chemical contaminants     Diet changes :  Best evidence is for whole food plant based/Mediterranean diet for health.  Small portion sized meals.  Metabolism changes in menopause require 200 Kcal/day less to maintain weight    High Protein at least 100g per day  Low carb - 30-40 gm with each meal.  High Fiber 25-30 gm each day (not included in supplements)    NO Sugar, Soda, Juices and Sweets.  --- Fill half of your plate with low carb veggies and greens as discussed.  -- eat low sugar  fruits apple, pears and berries: blueberries, strawberries, raspberries etc.    -- avoid tropical fruits like pineapple, mike, grapes, watermelon, papaya  -- about 25% fruits and 75% veggies  to reduce sugar intake    More freshly prepared meals than frozen or take-out  More plant based foods than processed meats or foods  (try to keep ingredients <5)  Incorporate good fats - Eg. :Extra virgin olive oil, Nuts, Avocado.     Consider starting the following supplements:  Vit B12  (chewable or drops version as it requires salivia for activation)    Vitamin D with K2 supplementation  Magnesium L threonate or glycinate  DHA & EPA  Probiotics      Exercise :  Brisk walk 30-45 min every day or a total of 150 min per week - Goal 12,000 steps/daily  Lift weights 3-4 times a week - Heavy - 3 Sets of 7 Heavy reps  - whole body dumbbell or machines  Core exercises (bird dog, plank with mountain climbers, dead bug) 3 sets of 10 (three - four times a week )  Balance - yoga or balance exercises  10 minutes of jumping per week    Environment/Stress:  Sleep in room with temperature <67 degrees  Cotton/natural fiber sheets and blankets  Fan  Meditation (try balance indy) start with 1 min/daily  Avoid alcohol as can trigger hot flashes and be a source of empty calories     If None of the above changes are working to address your symptoms - follow up with us in the office.    Follow up 3 months     Reviewed options for treatment for menopause including diet, exercise, weight loss, stress reduction, environmental and medication.  Patient to try estrogen with progesterone quarterly and return to the office in 3 months to review effectiveness of plan. Patient was provided with informed consent for medication including a review of the proposed medication and all effective possibilities.  A discussion of the risks of the medication, benefits, side effects, and success were addressed.  Alternative treatments were discussed as well.     Oral  estrogens should be avoided in women with hypertriglyceridemia, active gallbladder disease, or known thrombophilias such as factor V Leiden (with or without a personal history of venous thromboembolism [VTE]). Transdermal estrogen is also preferred for women with migraine headaches with auras. However, the baseline risk of both VTE and stroke is very low in otherwise healthy, young, postmenopausal women.      All questions were answered.  Patient is to proceed with Medication.    1. Menorrhagia with regular cycle    2. Endometrial polyp    3. Perimenopause

## 2025-04-19 ENCOUNTER — HOSPITAL ENCOUNTER (OUTPATIENT)
Dept: MAMMOGRAPHY | Facility: HOSPITAL | Age: 45
Discharge: HOME | End: 2025-04-19
Attending: OBSTETRICS & GYNECOLOGY
Payer: COMMERCIAL

## 2025-04-19 DIAGNOSIS — R92.343 EXTREMELY DENSE TISSUE OF BOTH BREASTS ON MAMMOGRAPHY: ICD-10-CM

## 2025-04-19 PROCEDURE — 76641 ULTRASOUND BREAST COMPLETE: CPT | Performed by: OBSTETRICS & GYNECOLOGY

## 2025-05-16 ENCOUNTER — LAB ENCOUNTER (OUTPATIENT)
Dept: LAB | Age: 45
End: 2025-05-16
Attending: INTERNAL MEDICINE
Payer: COMMERCIAL

## 2025-05-16 ENCOUNTER — OFFICE VISIT (OUTPATIENT)
Dept: INTERNAL MEDICINE CLINIC | Facility: CLINIC | Age: 45
End: 2025-05-16
Payer: COMMERCIAL

## 2025-05-16 VITALS
TEMPERATURE: 98 F | OXYGEN SATURATION: 99 % | WEIGHT: 154.19 LBS | SYSTOLIC BLOOD PRESSURE: 150 MMHG | RESPIRATION RATE: 16 BRPM | HEART RATE: 100 BPM | DIASTOLIC BLOOD PRESSURE: 100 MMHG | BODY MASS INDEX: 25.69 KG/M2 | HEIGHT: 65 IN

## 2025-05-16 DIAGNOSIS — K59.03 DRUG-INDUCED CONSTIPATION: ICD-10-CM

## 2025-05-16 DIAGNOSIS — I10 ESSENTIAL HYPERTENSION: Primary | ICD-10-CM

## 2025-05-16 DIAGNOSIS — E61.1 IRON DEFICIENCY: ICD-10-CM

## 2025-05-16 LAB
DEPRECATED HBV CORE AB SER IA-ACNC: 15 NG/ML (ref 50–306)
IRON SATN MFR SERPL: 24 % (ref 15–50)
IRON SERPL-MCNC: 109 UG/DL (ref 50–170)
TOTAL IRON BINDING CAPACITY: 460 UG/DL (ref 250–425)
TRANSFERRIN SERPL-MCNC: 387 MG/DL (ref 250–380)

## 2025-05-16 PROCEDURE — 83550 IRON BINDING TEST: CPT | Performed by: INTERNAL MEDICINE

## 2025-05-16 PROCEDURE — 3008F BODY MASS INDEX DOCD: CPT | Performed by: INTERNAL MEDICINE

## 2025-05-16 PROCEDURE — 99213 OFFICE O/P EST LOW 20 MIN: CPT | Performed by: INTERNAL MEDICINE

## 2025-05-16 PROCEDURE — 3080F DIAST BP >= 90 MM HG: CPT | Performed by: INTERNAL MEDICINE

## 2025-05-16 PROCEDURE — 3077F SYST BP >= 140 MM HG: CPT | Performed by: INTERNAL MEDICINE

## 2025-05-16 PROCEDURE — 83540 ASSAY OF IRON: CPT | Performed by: INTERNAL MEDICINE

## 2025-05-16 PROCEDURE — 82728 ASSAY OF FERRITIN: CPT | Performed by: INTERNAL MEDICINE

## 2025-05-16 RX ORDER — MELATONIN
1000 DAILY
COMMUNITY

## 2025-05-16 RX ORDER — VALSARTAN 160 MG/1
240 TABLET ORAL EVERY MORNING
Qty: 135 TABLET | Refills: 3 | Status: SHIPPED | OUTPATIENT
Start: 2025-05-16

## 2025-05-16 NOTE — PROGRESS NOTES
[facility] Internal Medicine Office Note  Chief Complaint:   Chief Complaint   Patient presents with    Blood Pressure       HPI:   This is a 44 year old female coming in for HTN   HPI  Blood pressure has been high at home   144/80 mid-day blood pressure   Takes valsartan 180mg daily     She started hormonal treatment for perimenopause symptoms and sees gynecology  She had an ablation but continues to have heavy periods and spotting    Occasional constipation related to iron tablets    Iron taking once daily   Twice a day dosing caused side effects         Problem List[1]  Past Surgical History[2]  Family History[3]     I reviewed her's Past Medical History, Past Surgical History, Family History and   Social History updated shows  Short Social Hx on File[4]  Allergies:  Allergies[5]  Current Medications[6]      REVIEW OF SYSTEMS:   Review of Systems   Constitutional:  Negative for fever.   HENT:  Negative for congestion.    Eyes:  Negative for visual disturbance.   Respiratory:  Negative for shortness of breath.    Cardiovascular:  Negative for chest pain.   Gastrointestinal:  Negative for constipation.   Genitourinary:  Negative for dysuria.   Neurological: Negative.    Hematological: Negative.    Psychiatric/Behavioral: Negative.          EXAM:   BP (!) 150/100   Pulse 100   Temp 98.1 °F (36.7 °C) (Temporal)   Resp 16   Ht 5' 5\" (1.651 m)   Wt 154 lb 3.2 oz (69.9 kg)   LMP 05/07/2025 (Exact Date)   SpO2 99%   BMI 25.66 kg/m²  Estimated body mass index is 25.66 kg/m² as calculated from the following:    Height as of this encounter: 5' 5\" (1.651 m).    Weight as of this encounter: 154 lb 3.2 oz (69.9 kg).   Vital signs reviewed. Appears stated age, well groomed.  Physical Exam  Vitals reviewed.   Constitutional:       General: She is not in acute distress.     Appearance: She is well-developed.   HENT:      Head: Normocephalic and atraumatic.   Eyes:      Conjunctiva/sclera: Conjunctivae normal.    Cardiovascular:      Rate and Rhythm: Normal rate and regular rhythm.      Heart sounds: Normal heart sounds.   Pulmonary:      Effort: Pulmonary effort is normal.      Breath sounds: Normal breath sounds.   Musculoskeletal:      Cervical back: Neck supple.   Skin:     General: Skin is warm and dry.   Neurological:      Mental Status: She is alert.          ASSESSMENT AND PLAN:   Suma Cox is a 44 year old female with  1. Essential hypertension    2. Iron deficiency    3. Drug-induced constipation          The plan is as follows  Suma was seen today for blood pressure.    Diagnoses and all orders for this visit:    Essential hypertension - increase valsartan to 1.5 tablets daily. Check BP at home and send me readings in 1-2 weeks    Iron deficiency - continue iron once daily. She had side effects at higher dosing. Check labs. Will have her see hematology for possible iron infusion if levels continue to be low   -     Iron And Tibc [E]; Future  -     Ferritin [E]; Future  -     Oncology/Hematology Referral - In Network    Drug-induced constipation - start miralax as needed     Other orders  -     valsartan 160 MG Oral Tab; Take 1.5 tablets (240 mg total) by mouth every morning.        Orders Placed This Encounter   Procedures    Iron And Tibc [E]    Ferritin [E]       Meds & Refills for this Visit:  Requested Prescriptions     Signed Prescriptions Disp Refills    valsartan 160 MG Oral Tab 135 tablet 3     Sig: Take 1.5 tablets (240 mg total) by mouth every morning.       Imaging & Consults:  OP REFERRAL TO HEMATOLOGY/ONCOLOGY GROUP    Health Maintenance Due   Topic Date Due    Colorectal Cancer Screening  Never done    COVID-19 Vaccine (6 - 2024-25 season) 09/01/2024    HTN: BP Follow-Up  04/20/2025    Annual Physical  08/02/2025     Patient/Caregiver Education: Patient/Caregiver Education: There are no barriers to learning. Medical education done. Outcome: Patient verbalizes understanding. Patient is  notified to call with any questions, complications, allergies, or worsening or changing symptoms.  Patient is to call with any side effects or complications from the treatments as a result of today.     Kim Hemphill MD         [1]   Patient Active Problem List  Diagnosis    Essential hypertension    Migraine without aura and without status migrainosus, not intractable    Irregular uterine bleeding    Menorrhagia with regular cycle   [2]   Past Surgical History:  Procedure Laterality Date    D & c  3/3/2025    Endometrial ablation  3/3/2025    Remove intrauterine device      [place] teeth removed     [3]   Family History  Problem Relation Age of Onset    Hypertension Mother     Psoriasis Mother     Lipids Mother     Other (osteoporosis) Mother     Diabetes Mother         Diagnosed  (age 82)    Osteoporosis Mother     Cancer Father         Brain and lung cancer    Hypertension Brother     Lipids Brother     Other (Other) Brother         thyroid disorder   [4]   Social History  Socioeconomic History    Marital status:    Tobacco Use    Smoking status: Former     Current packs/day: 0.00     Types: Cigarettes     Quit date: 2016     Years since quittin.3     Passive exposure: Never    Smokeless tobacco: Never   Vaping Use    Vaping status: Never Used   Substance and Sexual Activity    Alcohol use: No    Drug use: No    Sexual activity: Yes     Partners: Male     Birth control/protection: Contraceptive patch     Social Drivers of Health     Food Insecurity: No Food Insecurity (3/20/2025)    NCSS - Food Insecurity     Worried About Running Out of Food in the Last Year: No     Ran Out of Food in the Last Year: No   Transportation Needs: No Transportation Needs (3/20/2025)    NCSS - Transportation     Lack of Transportation: No   Housing Stability: Not At Risk (3/20/2025)    NCSS - Housing/Utilities     Has Housing: Yes     Worried About Losing Housing: No     Unable to Get Utilities: No   [5]    Allergies  Allergen Reactions    Seasonal Coughing and Runny nose   [6]   Current Outpatient Medications   Medication Sig Dispense Refill    cyanocobalamin 1000 MCG Oral Tab Take 1 tablet (1,000 mcg total) by mouth daily.      valsartan 160 MG Oral Tab Take 1.5 tablets (240 mg total) by mouth every morning. 135 tablet 3    estradiol (MINIVELLE) 0.075 MG/24HR Transdermal Patch Biweekly Place 1 patch onto the skin twice a week. 24 patch 4    progesterone 200 MG Oral Cap Take nightly for 10 nights every three months 40 capsule 1    cholecalciferol 125 MCG (5000 UT) Oral Tab Take 1 tablet (5,000 Units total) by mouth daily.      Iron-Vitamin C  MG Oral Tab daily.      PEG 3350-KCl-Na Bicarb-NaCl 420 g Oral Recon Soln Take as directed by physician (Patient not taking: Reported on 5/16/2025) 4000 mL 0    Ketorolac Tromethamine 10 MG Oral Tab Take 1 tablet (10 mg total) by mouth every 6 (six) hours as needed for Pain. (Patient not taking: Reported on 5/16/2025) 20 tablet 1

## 2025-05-16 NOTE — PATIENT INSTRUCTIONS
Increase valsartan to 1.5 tablets daily  Send me a message in 1-2 weeks with blood pressure readings     Miralax for constipation  Plenty of water and fiber in the diet.     Blood work     We'll see where the iron levels are at - call hematology office to schedule appointment if instructed to do so after I review the results

## 2025-05-20 ENCOUNTER — TELEPHONE (OUTPATIENT)
Age: 45
End: 2025-05-20

## 2025-05-20 NOTE — TELEPHONE ENCOUNTER
Suma calling to set up consult for Iron deficiency dr cisse referring pt has bcbs ppo. yvonne

## 2025-05-21 ENCOUNTER — OFFICE VISIT (OUTPATIENT)
Dept: OBGYN CLINIC | Facility: CLINIC | Age: 45
End: 2025-05-21
Payer: COMMERCIAL

## 2025-05-21 VITALS
BODY MASS INDEX: 25.66 KG/M2 | WEIGHT: 154 LBS | DIASTOLIC BLOOD PRESSURE: 99 MMHG | HEIGHT: 65 IN | SYSTOLIC BLOOD PRESSURE: 144 MMHG

## 2025-05-21 DIAGNOSIS — N84.0 ENDOMETRIAL POLYP: ICD-10-CM

## 2025-05-21 DIAGNOSIS — N95.1 PERIMENOPAUSE: ICD-10-CM

## 2025-05-21 DIAGNOSIS — N71.9 ENDOMETRITIS: ICD-10-CM

## 2025-05-21 DIAGNOSIS — R23.2 HOT FLASHES: ICD-10-CM

## 2025-05-21 DIAGNOSIS — G47.9 SLEEP DISTURBANCES: ICD-10-CM

## 2025-05-21 DIAGNOSIS — N95.1 SYMPTOMATIC MENOPAUSAL OR FEMALE CLIMACTERIC STATES: Primary | ICD-10-CM

## 2025-05-21 PROCEDURE — 3080F DIAST BP >= 90 MM HG: CPT | Performed by: OBSTETRICS & GYNECOLOGY

## 2025-05-21 PROCEDURE — 3008F BODY MASS INDEX DOCD: CPT | Performed by: OBSTETRICS & GYNECOLOGY

## 2025-05-21 PROCEDURE — 99214 OFFICE O/P EST MOD 30 MIN: CPT | Performed by: OBSTETRICS & GYNECOLOGY

## 2025-05-21 PROCEDURE — 3077F SYST BP >= 140 MM HG: CPT | Performed by: OBSTETRICS & GYNECOLOGY

## 2025-05-21 RX ORDER — PROGESTERONE 100 MG/1
100 CAPSULE ORAL NIGHTLY
Qty: 40 CAPSULE | Refills: 1 | Status: SHIPPED | OUTPATIENT
Start: 2025-05-21

## 2025-05-21 RX ORDER — ESTRADIOL 0.05 MG/D
1 PATCH, EXTENDED RELEASE TRANSDERMAL
Qty: 24 PATCH | Refills: 4 | Status: SHIPPED | OUTPATIENT
Start: 2025-05-22

## 2025-05-21 RX ORDER — DOXYCYCLINE 100 MG/1
100 CAPSULE ORAL 2 TIMES DAILY
Qty: 20 CAPSULE | Refills: 0 | Status: SHIPPED | OUTPATIENT
Start: 2025-05-21 | End: 2025-05-31

## 2025-05-21 NOTE — PROGRESS NOTES
Chief Complaint   Patient presents with    Follow - Up     HRT f/u, change dose, vaginal discharge since the ablation, first period cramping and heavy bleeding, 2nd period light bleeding but with blood clots.         Suma Cox is a 44 year old female who presents for HRT f/u.    LMP: 25.    Menses regular: varys  Menstrual flow heavy and then light.    Birth control or HRT: minivelle.   Refill if changes in doses.  Last Pap Smear: 2023 (normal) . Any history of abnormal paps: none   Gardasil:(age 8-46 y/o) no.   Any medication refills needed today?: n/a    Problems/concerns: period flow.      Next Appt: Pt states she will schedule via .        Immunization History   Administered Date(s) Administered    Covid-19 Vaccine Pfizer 30 mcg/0.3 ml 2021, 2021, 10/08/2021    Covid-19 Vaccine Pfizer Anthony-Sucrose 30 mcg/0.3 ml 2022    FLULAVAL 6 months & older 0.5 ml Prefilled syringe (77145) 10/05/2019    FLUZONE 6 months and older PFS 0.5 ml (89776) 10/05/2019, 2021, 10/07/2022, 10/03/2023    Flucelvax 0.5 Ml Quad PFS Single Dose 10/07/2020    Flucelvax Influenza vaccine, trivalent (ccIIV3), 0.5mL IM 10/07/2020, 2024    HEP B, Adult 2020, 2020, 2021    Hep A, Adult 2018, 2018    Influenza 10/07/2020, 10/03/2023    Pfizer Covid-19 Vaccine 30mcg/0.3ml 12yrs+ 2023    TDAP 2018       Medications - Current[1]    Allergies[2]    OB History    Para Term  AB Living   0 0 0 0 0 0   SAB IAB Ectopic Multiple Live Births   0 0 0 0 0       Gyn History      No data recorded      Latest Ref Rng & Units 2023     6:56 PM   RECENT PAP RESULTS   INTERPRETATION/RESULT:  Cytology Results: Negative for intraepithelial lesion or malignancy.    HPV Negative Negative            Past Medical History[3]    Past Surgical History[4]    Family History[5]     Tobacco  Allergies  Meds         Social History     Socioeconomic History    Marital  status:      Spouse name: Not on file    Number of children: Not on file    Years of education: Not on file    Highest education level: Not on file   Occupational History    Not on file   Tobacco Use    Smoking status: Former     Current packs/day: 0.00     Types: Cigarettes     Quit date: 2016     Years since quittin.3     Passive exposure: Never    Smokeless tobacco: Never   Vaping Use    Vaping status: Never Used   Substance and Sexual Activity    Alcohol use: No    Drug use: No    Sexual activity: Yes     Partners: Male     Birth control/protection: Contraceptive patch   Other Topics Concern    Caffeine Concern Not Asked    Exercise Not Asked    Seat Belt Not Asked    Special Diet Not Asked    Stress Concern Not Asked    Weight Concern Not Asked   Social History Narrative    Not on file     Social Drivers of Health     Food Insecurity: No Food Insecurity (3/20/2025)    NCSS - Food Insecurity     Worried About Running Out of Food in the Last Year: No     Ran Out of Food in the Last Year: No   Transportation Needs: No Transportation Needs (3/20/2025)    NCSS - Transportation     Lack of Transportation: No   Stress: Not on file   Housing Stability: Not At Risk (3/20/2025)    NCSS - Housing/Utilities     Has Housing: Yes     Worried About Losing Housing: No     Unable to Get Utilities: No       BP (!) 144/99   Ht 5' 5\" (1.651 m)   Wt 154 lb (69.9 kg)   LMP 2025 (Exact Date)     Wt Readings from Last 3 Encounters:   25 154 lb (69.9 kg)   25 154 lb 3.2 oz (69.9 kg)   25 151 lb (68.5 kg)       Health Maintenance   Topic Date Due    Colorectal Cancer Screening  Never done    COVID-19 Vaccine ( season) 2024    HTN: BP Follow-Up  2025    Annual Physical  2025    Mammogram  2026    Pap Smear  2026    DTaP,Tdap,and Td Vaccines (2 - Td or Tdap) 2028    Influenza Vaccine  Completed    Annual Depression Screening  Completed    Pneumococcal  Vaccine: Birth to 50yrs  Aged Out    Meningococcal B Vaccine  Aged Out     Final Diagnosis:      Endometrium/endometrial polyp; polypectomy/curettage:  Fragments of benign endometrial polyp and endometrial tissue demonstrating predominantly inactive characteristics and focal features compatible with shedding/breakdown.  No evidence of endometrial hyperplasia, cytologic atypia, or malignancy identified.  Fragments of endocervical and focal ectocervical tissue demonstrating areas of moderate chronic cervicitis, foci of mild acute cervicitis, foci of mild microglandular hyperplasia, and areas of squamous metaplastic change.  No evidence of cervical dysplasia, polyps, or malignancy identified.            Review of Systems   General: Present- Feeling well. Not Present- Fever.  Female Genitourinary: Not Present- Dysmenorrhea, Dyspareunia, Flank Pain, Frequency, Menstrual Irregularities, Pelvic Pain, Urgency, Urinary Complaints, Vaginal Bleeding and Vaginal dryness.  Pain: Present- Pain Rating - 0 on a 0-10 scale.  All other systems negative       Physical Exam   The physical exam findings are as follows:     Abdomen   Inspection: - Inspection Normal.  Palpation/Percussion: Palpation and Percussion of the abdomen reveal - Non Tender, No hepatosplenomegaly and No Palpable abdominal masses.    Female Genitourinary     External Genitalia   Perineum - Normal. Bartholin's Gland - Bilateral - Normal. Clitoris - Normal.  Introitus: Characteristics - Normal. Discharge - None.  Labia Majora: Lesions - Bilateral - None. Characteristics - Bilateral - Normal.  Labia Minora: Lesions - Bilateral - None. Characteristics - Bilateral - Normal.  Urethra: Characteristics - Normal. Discharge - None.  Worley Gland - Bilateral - Normal.  Vulva: Characteristics - Normal. Lesions - None.    Speculum & Bimanual   Vagina:   Vaginal Wall: - Normal.  Vaginal Lesions - None. Vaginal Mucosa - Normal.  Cervix: Characteristics - No Motion tenderness.  Discharge - None.  Uterus: Characteristics - Non Tender. Position - Midposition.  Adnexa: Characteristics - Bilateral - Tender. Masses - No Adnexal Masses.      Rectal   Anorectal Exam: External - normal external exam.    Lymphatic  General Lymphatics   Description - Normal .    Adjustment of HRT and expectations for bleeding post ablation reviewed. Doxy sent to pharmacy to treat inflammation .     1. Symptomatic menopausal or female climacteric states    2. Endometrial polyp    3. Perimenopause    4. Hot flashes    5. Sleep disturbances    6. Endometritis                   [1]   Current Outpatient Medications:     Doxycycline Monohydrate 100 MG Oral Cap, Take 1 capsule (100 mg total) by mouth 2 (two) times daily for 10 days., Disp: 20 capsule, Rfl: 0    progesterone 100 MG Oral Cap, Take 1 capsule (100 mg total) by mouth nightly., Disp: 40 capsule, Rfl: 1    [START ON 5/22/2025] estradiol (MINIVELLE) 0.05 MG/24HR Transdermal Patch Biweekly, Place 1 patch onto the skin twice a week., Disp: 24 patch, Rfl: 4    cyanocobalamin 1000 MCG Oral Tab, Take 1 tablet (1,000 mcg total) by mouth daily., Disp: , Rfl:     valsartan 160 MG Oral Tab, Take 1.5 tablets (240 mg total) by mouth every morning., Disp: 135 tablet, Rfl: 3    cholecalciferol 125 MCG (5000 UT) Oral Tab, Take 1 tablet (5,000 Units total) by mouth daily., Disp: , Rfl:     Iron-Vitamin C  MG Oral Tab, daily., Disp: , Rfl:     PEG 3350-KCl-Na Bicarb-NaCl 420 g Oral Recon Soln, Take as directed by physician (Patient not taking: Reported on 5/21/2025), Disp: 4000 mL, Rfl: 0    Ketorolac Tromethamine 10 MG Oral Tab, Take 1 tablet (10 mg total) by mouth every 6 (six) hours as needed for Pain. (Patient not taking: Reported on 5/21/2025), Disp: 20 tablet, Rfl: 1  [2]   Allergies  Allergen Reactions    Seasonal Coughing and Runny nose   [3]   Past Medical History:   Abnormal uterine bleeding    After start of hrt patch    Anemia    Due to heavy periods     Decorative tattoo    One small on lower back    Dysmenorrhea    Essential hypertension    High blood pressure    Migraines    without aura   [4]   Past Surgical History:  Procedure Laterality Date    D & c  3/3/2025    Endometrial ablation  3/3/2025    Remove intrauterine device      [place] teeth removed  2012   [5]   Family History  Problem Relation Age of Onset    Hypertension Mother     Psoriasis Mother     Lipids Mother     Other (osteoporosis) Mother     Diabetes Mother         Diagnosed 2023 (age 82)    Osteoporosis Mother     Cancer Father         Brain and lung cancer    Hypertension Brother     Lipids Brother     Other (Other) Brother         thyroid disorder

## 2025-05-22 ENCOUNTER — TELEPHONE (OUTPATIENT)
Dept: OBGYN CLINIC | Facility: CLINIC | Age: 45
End: 2025-05-22

## 2025-05-22 ENCOUNTER — PATIENT MESSAGE (OUTPATIENT)
Dept: OBGYN CLINIC | Facility: CLINIC | Age: 45
End: 2025-05-22

## 2025-05-22 NOTE — TELEPHONE ENCOUNTER
Ham Moise,      Your ferritin level is still low. Plan to follow up with hematology specialist for possible infusion.      Sincerely,   Dr. Hemphill   Written by Kim Hemphill MD on 5/19/2025  4:08 PM CDT

## 2025-05-29 ENCOUNTER — TELEPHONE (OUTPATIENT)
Dept: OBGYN CLINIC | Facility: CLINIC | Age: 45
End: 2025-05-29

## 2025-05-29 NOTE — TELEPHONE ENCOUNTER
Spoke to [facility] representative.     Representative states [facility] does have Venofer available however they also have option of Feraheme 5-10 mg which the pt only has to come in twice which would be a lower cost than Venofer. Routing to Dr. Jimenez to evaluate option.     [facility] needs last 2-3 office visit notes and 2 sets of ferritin labs. Will fax once we confirm with Dr. Jimenez.

## 2025-06-02 NOTE — TELEPHONE ENCOUNTER
Called representative. Informed representative we can procede with Feraheme. Infusion center will send form for our office to fill out.     Will fax office visit notes and labs.     Fax number- [phone]

## 2025-06-11 ENCOUNTER — APPOINTMENT (OUTPATIENT)
Age: 45
End: 2025-06-11
Attending: INTERNAL MEDICINE
Payer: COMMERCIAL

## 2025-06-16 ENCOUNTER — PATIENT MESSAGE (OUTPATIENT)
Dept: INTERNAL MEDICINE CLINIC | Facility: CLINIC | Age: 45
End: 2025-06-16

## 2025-06-26 ENCOUNTER — PATIENT MESSAGE (OUTPATIENT)
Dept: INTERNAL MEDICINE CLINIC | Facility: CLINIC | Age: 45
End: 2025-06-26

## 2025-06-26 DIAGNOSIS — E61.1 IRON DEFICIENCY: Primary | ICD-10-CM

## 2025-07-01 RX ORDER — PROGESTERONE 100 MG/1
100 CAPSULE ORAL NIGHTLY
Qty: 40 CAPSULE | Refills: 1 | OUTPATIENT
Start: 2025-07-01

## 2025-07-01 RX ORDER — ESTRADIOL 0.05 MG/D
1 PATCH, EXTENDED RELEASE TRANSDERMAL
Qty: 24 PATCH | Refills: 4 | OUTPATIENT
Start: 2025-07-03

## 2025-07-21 RX ORDER — PROGESTERONE 100 MG/1
100 CAPSULE ORAL NIGHTLY
Qty: 80 CAPSULE | Refills: 3 | OUTPATIENT
Start: 2025-07-21

## 2025-07-30 ENCOUNTER — LAB ENCOUNTER (OUTPATIENT)
Dept: LAB | Age: 45
End: 2025-07-30
Attending: INTERNAL MEDICINE

## 2025-07-30 DIAGNOSIS — E61.1 IRON DEFICIENCY: ICD-10-CM

## 2025-07-30 LAB
DEPRECATED HBV CORE AB SER IA-ACNC: 117 NG/ML (ref 50–306)
IRON SATN MFR SERPL: 28 % (ref 15–50)
IRON SERPL-MCNC: 101 UG/DL (ref 50–170)
TOTAL IRON BINDING CAPACITY: 364 UG/DL (ref 250–425)
TRANSFERRIN SERPL-MCNC: 292 MG/DL (ref 250–380)

## 2025-07-30 PROCEDURE — 83540 ASSAY OF IRON: CPT | Performed by: INTERNAL MEDICINE

## 2025-07-30 PROCEDURE — 82728 ASSAY OF FERRITIN: CPT | Performed by: INTERNAL MEDICINE

## 2025-07-30 PROCEDURE — 83550 IRON BINDING TEST: CPT | Performed by: INTERNAL MEDICINE

## 2025-08-11 RX ORDER — VALSARTAN 160 MG/1
160 TABLET ORAL DAILY
Qty: 90 TABLET | Refills: 3 | OUTPATIENT
Start: 2025-08-11

## 2025-08-15 PROBLEM — Z12.11 SPECIAL SCREENING FOR MALIGNANT NEOPLASMS, COLON: Status: ACTIVE | Noted: 2025-08-15

## (undated) DEVICE — HANDPIECE ABLAT DIA6MM ENDOMET IMPED CTRL DEV

## (undated) DEVICE — HYSTEROSCOPY: Brand: MEDLINE INDUSTRIES, INC.

## (undated) DEVICE — CANISTER SUCT 3000CC HI FLO DISP FOR FLD MGMT

## (undated) DEVICE — 1016 S-DRAPE IRRIG POUCH 10/BOX: Brand: STERI-DRAPE™

## (undated) DEVICE — MEDI-VAC NON-CONDUCTIVE SUCTION TUBING: Brand: CARDINAL HEALTH

## (undated) DEVICE — PAD,NON-ADHERENT,3X8,STERILE,LF,1/PK: Brand: MEDLINE

## (undated) DEVICE — ELITE HYSTEROSCOPE SEAL: Brand: TRUCLEAR

## (undated) DEVICE — GLOVE SUR 6.5 SENSICARE PIP WHT PWD F

## (undated) DEVICE — KIT HYSTEROSCOPIC PROC INCL INFLO OUTFLO TB

## (undated) NOTE — LETTER
MINOR CASE LETTER      2/4/2025        Dear Suma,    Your are having a hysteroscopy D&C with ablation on Monday,March 3rd at 1PM. Please arrive to the hospital 2 hours early.    Do not eat or drink anything (including water) after midnight the night before surgery.    If your procedure is scheduled later in the day, then nothing by mouth for 6 hours before arrival to the hospital.    You are to call this office if you have any cold or flu symptoms 2 days before your scheduled surgery.    Please avoid aspirin 7 days before surgery.  Avoid Ibuprofen, Motrin, Aleve, or Naprosyn for 3 days before surgery.    You will be contacted by PreAdmission Testing (PAT) usually within the week before surgery.  They will take a short medical history and let you know if any preoperative testing is needed.    Contact your insurance company to let them know you are having a hysteroscopy D&C with ablation done. If you have an HMO or EPO insurance, we will initiate the referral for you.    Please make arrangements for someone to drive you home after your surgery.    Please feel free to contact our office at [phone] if you have any questions regarding these instructions or your procedure.      Sincerely,    Addie Jimenez MD  [facility], [facility] - OB/GYN  [address]  [facility] [id number] [phone]